# Patient Record
Sex: MALE | Race: WHITE | Employment: OTHER | URBAN - METROPOLITAN AREA
[De-identification: names, ages, dates, MRNs, and addresses within clinical notes are randomized per-mention and may not be internally consistent; named-entity substitution may affect disease eponyms.]

---

## 2020-11-23 ENCOUNTER — OFFICE VISIT (OUTPATIENT)
Dept: CARDIOLOGY CLINIC | Age: 65
End: 2020-11-23
Payer: COMMERCIAL

## 2020-11-23 ENCOUNTER — TELEPHONE (OUTPATIENT)
Dept: CARDIOLOGY CLINIC | Age: 65
End: 2020-11-23

## 2020-11-23 VITALS
RESPIRATION RATE: 18 BRPM | DIASTOLIC BLOOD PRESSURE: 70 MMHG | OXYGEN SATURATION: 92 % | HEIGHT: 68 IN | BODY MASS INDEX: 23.89 KG/M2 | SYSTOLIC BLOOD PRESSURE: 114 MMHG | HEART RATE: 70 BPM | WEIGHT: 157.6 LBS

## 2020-11-23 PROBLEM — R55 SYNCOPE: Status: ACTIVE | Noted: 2020-11-23

## 2020-11-23 PROBLEM — Z72.0 TOBACCO ABUSE: Status: ACTIVE | Noted: 2020-11-23

## 2020-11-23 PROBLEM — E78.5 HYPERLIPIDEMIA: Status: ACTIVE | Noted: 2020-11-23

## 2020-11-23 PROBLEM — F10.10 ALCOHOL ABUSE: Status: ACTIVE | Noted: 2020-11-23

## 2020-11-23 PROCEDURE — 99204 OFFICE O/P NEW MOD 45 MIN: CPT | Performed by: INTERNAL MEDICINE

## 2020-11-23 PROCEDURE — G8484 FLU IMMUNIZE NO ADMIN: HCPCS | Performed by: INTERNAL MEDICINE

## 2020-11-23 PROCEDURE — 3017F COLORECTAL CA SCREEN DOC REV: CPT | Performed by: INTERNAL MEDICINE

## 2020-11-23 PROCEDURE — 0296T PR EXT ECG > 48HR TO 21 DAY RCRD W/CONECT INTL RCRD: CPT | Performed by: INTERNAL MEDICINE

## 2020-11-23 PROCEDURE — G8420 CALC BMI NORM PARAMETERS: HCPCS | Performed by: INTERNAL MEDICINE

## 2020-11-23 PROCEDURE — 4004F PT TOBACCO SCREEN RCVD TLK: CPT | Performed by: INTERNAL MEDICINE

## 2020-11-23 PROCEDURE — G8427 DOCREV CUR MEDS BY ELIG CLIN: HCPCS | Performed by: INTERNAL MEDICINE

## 2020-11-23 RX ORDER — ATORVASTATIN CALCIUM 40 MG/1
40 TABLET, FILM COATED ORAL DAILY
Qty: 90 TABLET | Refills: 1 | Status: SHIPPED | OUTPATIENT
Start: 2020-11-23

## 2020-11-23 RX ORDER — NIFEDIPINE 30 MG/1
30 TABLET, FILM COATED, EXTENDED RELEASE ORAL DAILY
Status: ON HOLD | COMMUNITY
End: 2020-12-24 | Stop reason: HOSPADM

## 2020-11-23 RX ORDER — ATORVASTATIN CALCIUM 20 MG/1
20 TABLET, FILM COATED ORAL DAILY
COMMUNITY
End: 2020-11-23

## 2020-11-23 RX ORDER — ASPIRIN 81 MG/1
81 TABLET ORAL DAILY
Qty: 90 TABLET | Refills: 1 | Status: ON HOLD | OUTPATIENT
Start: 2020-11-23 | End: 2020-12-24 | Stop reason: HOSPADM

## 2020-11-23 SDOH — HEALTH STABILITY: MENTAL HEALTH: HOW OFTEN DO YOU HAVE A DRINK CONTAINING ALCOHOL?: 4 OR MORE TIMES A WEEK

## 2020-11-23 SDOH — HEALTH STABILITY: MENTAL HEALTH: HOW MANY STANDARD DRINKS CONTAINING ALCOHOL DO YOU HAVE ON A TYPICAL DAY?: 5 OR 6

## 2020-11-23 ASSESSMENT — ENCOUNTER SYMPTOMS
PHOTOPHOBIA: 0
CHEST TIGHTNESS: 0
SHORTNESS OF BREATH: 0
BLOOD IN STOOL: 0
ABDOMINAL DISTENTION: 0
NAUSEA: 0
COUGH: 0
ABDOMINAL PAIN: 0

## 2020-11-23 NOTE — PATIENT INSTRUCTIONS
Tobacco cessation  Monitor  Decrease alcohol intake  Increase lipitor to 40 mg daily  Follow up with vascular surgeon  Recommend daily asa 81 mg daily

## 2020-11-23 NOTE — PROGRESS NOTES
[address]  11/23/20  Referring: Dr. Alcantar ref. provider found    REASON FOR CONSULT/CHIEF COMPLAINT/HPI     Reason for visit/ Chief complaint  New patient for syncope, abnormal ekg   HPI Kinza Lopez is a 63 y.o. Seen in consult with Dr Brittaney Glaser for syncope. He has a history of htn,hchol, carotid endarterectomy, PVD   He has had 4-5 lower back surgeries for spinal stenosis/arthritis. He has a spinal stimulator. Denies diabetes, but does have htn, hchol. He has smoked for 40 years, has no known lung disease. He drinks 6-8 12 ounce beers  A day. Has upcoming testing for elevated bilirubin. Takes xanax daily for anxiety  He is here today with his wife. Twice this summer, he got up to go to the bathroom, and ended up on the floor. He only remembers waking up on the floor. He quickly got up out of bed to go to the bathroom, knocked a light over on the nightstand, and ended up on the floor. His wife described him as conscious and dazed. He was not incontinent. He came to, went to the bathroom and came back and fell asleep. Prior to going to sleep, he had not done anything differently  Last Monday, he rolled the garbage can to the street( on a slight slant) had no chest pain, sob,palpitations or dizziness. He was on the way back to his house, and, woke up on the ground. This was unwitnessed. He woke up, went in, and his wife said he was awake and conscious and had scratches on his face. His wife reports a week before this episode, he complained of bilateral bicep pain. Today he complains of claudication. He can walk a flight of stairs, has no chest pain, palpitations dizziness or change in exertional sob. He has no orthopnea or edema. He has no fever chills or lack of smell/taste.   Exercise limited by back pain and claudications    Patient is compliant with medications and is tolerating them well without side effects     HISTORY/ALLERGIES/ROS     MedHx:  has a past medical history of Anxiety, CAD daily 20  Yes Sandip Hernandez DO   aspirin EC 81 MG EC tablet Take 1 tablet by mouth daily 20  Yes Sandip Hernandez DO   atenolol (TENORMIN) 50 MG tablet Take 3 tablets by mouth daily. 5/10/13  Yes Carrol Huggins MD   niacin (NIASPAN) 500 MG CR tablet Take 1,000 mg by mouth nightly. Yes Historical Provider, MD   alprazolam Pérez Chambers) 1 MG tablet Take 1 mg by mouth daily. Yes Historical Provider, MD       PHYSICAL EXAM        Vitals:    20 1309   BP: 114/70   Pulse: 70   Resp: 18   SpO2: 92%    Weight: 157 lb 9.6 oz (71.5 kg)     Gen Alert, cooperative, no distress Heart  Regular rate and rhythm, no murmur    Head Normocephalic, atraumatic, no abnormalities Abd  Soft, NT, +BS, no mass, no OM   Eyes PERRLA, conj/corn clear Ext  Ext nl, AT, no C/C, +edema   Nose Nares normal, no drain age, Non-tender Pulse 1+ and symmetric   Throat Lips, mucosa, tongue normal Skin Color/text/turg nl, no rash/lesions   Neck S/S, TM, NT, no bruit Psych Nl mood and affect   Lung  CTA-B, unlabored, no DTP     Ch wall NT, no deform       LABS and Imaging     Relevant and available CV data reviewed  Echo/MRI:  echo nl   Cath: denies ever having cardiac cath  Holter:not done  EK20  LAE  Nonspecific ST depression and T abd , neg t wave consider anteroseptal /anterior ischemia  Stress:  myoview--nl perfusion, RVH  EF 52%  moderate Risk  moderate Complexity/Medical Decision Making  Outside records Reviewed  Labs Reviewed  Prior Imaging, ekg, cath, echo reviewed when available  Medications reviewed  Old Notes reviewed  ASSESSMENT AND PLAN     1. Loss of consciousness  - new problem  Differential: arrhythmia, ischemia, seizure, fall from alcohol, carotid stenosis  -ct of head ordered by pcp  -carotid dopplers ordered by pcp  -stress echo ordered by pcp--to be done at [address] [place] [address]  - monitor--7 days    2. Essential hypertension  - controlled  /70 (Site: Left Upper Arm, Position: Sitting, Cuff Size: Medium Adult)   Pulse 70   Resp 18   Ht 5' 8\" (1.727 m)   Wt 157 lb 9.6 oz (71.5 kg)   SpO2 92%   BMI 23.96 kg/m²   -on norvasc 10 mg daily and tenormin 50 mg daily    3.hyperlipidemia  - worsening  On lipitor  Increase to 40 mg daily to high intensity statin given clinical ASCV    4. PVD  -stable  -Widely patent aortobifem graft and right femoral to peroneal graft, focal area of 50% stenosis left renal artery, , heavily calcified plaque formation within the left SFA with multiple areas of stenosis ranging from mild to severe  Was on coumadin, then xarelto, now on neither  Recommend follow up with vascular surgeon -discuss anticoagulant    5. Elevated bilirubin  -pcp has EGD and colonoscopy schedule  -recommend alcohol cessation    6. Occlusion and stenosis of bilateral carotid arteries  -Previous carotid endarterectomy  -Carotid dopplers, ct of head ordered by pcp  -Patient counseled on lifestyle modification, diet, and exercise. - recommend daily asa ( if he starts anticoagulants, may stop asa)    7. Claudication  - previous aortobifem graft    8. Tobacco use  - stable  -discussed importance of tobacco cessation  Unsuccessful with chantix, gum, nicotine patch    9. Alcohol use  Plan:  - patient needs to cut back    Follow Up: One month    Scribe Attestation:  Stacia Laureano am scribing for and in the presence of Herminio Hancock MD.   Kavitha Hong 11/23/20 8:47 PM     Physician Attestation  The scribe for and in the presence of liane Diane DO). The scribe samson kemp may have prepopulated components of this note with my historical  intellectual property under my direct supervision. Any additions to this intellectual property were performed in my presence and at my direction. Furthermore, the content and accuracy of this note have been reviewed by me Darin Diane DO).   11/23/2020 8:50 PM      Dr. Melissa Calhoun

## 2020-12-08 PROCEDURE — 0298T PR EXT ECG > 48HR TO 21 DAY REVIEW AND INTERPRETATN: CPT | Performed by: INTERNAL MEDICINE

## 2020-12-16 ENCOUNTER — OFFICE VISIT (OUTPATIENT)
Dept: CARDIOLOGY CLINIC | Age: 65
End: 2020-12-16
Payer: COMMERCIAL

## 2020-12-16 VITALS
DIASTOLIC BLOOD PRESSURE: 60 MMHG | WEIGHT: 156 LBS | BODY MASS INDEX: 23.64 KG/M2 | HEART RATE: 84 BPM | HEIGHT: 68 IN | SYSTOLIC BLOOD PRESSURE: 108 MMHG

## 2020-12-16 PROCEDURE — G8484 FLU IMMUNIZE NO ADMIN: HCPCS | Performed by: INTERNAL MEDICINE

## 2020-12-16 PROCEDURE — G8427 DOCREV CUR MEDS BY ELIG CLIN: HCPCS | Performed by: INTERNAL MEDICINE

## 2020-12-16 PROCEDURE — G8420 CALC BMI NORM PARAMETERS: HCPCS | Performed by: INTERNAL MEDICINE

## 2020-12-16 PROCEDURE — 4004F PT TOBACCO SCREEN RCVD TLK: CPT | Performed by: INTERNAL MEDICINE

## 2020-12-16 PROCEDURE — 99215 OFFICE O/P EST HI 40 MIN: CPT | Performed by: INTERNAL MEDICINE

## 2020-12-16 PROCEDURE — 3017F COLORECTAL CA SCREEN DOC REV: CPT | Performed by: INTERNAL MEDICINE

## 2020-12-16 RX ORDER — PREDNISONE 20 MG/1
TABLET ORAL
Qty: 3 TABLET | Refills: 0 | Status: SHIPPED | OUTPATIENT
Start: 2020-12-16 | End: 2020-12-17 | Stop reason: DRUGHIGH

## 2020-12-16 ASSESSMENT — ENCOUNTER SYMPTOMS
SHORTNESS OF BREATH: 0
PHOTOPHOBIA: 0
NAUSEA: 0
ABDOMINAL PAIN: 0
ABDOMINAL DISTENTION: 0
COUGH: 0
BLOOD IN STOOL: 0
CHEST TIGHTNESS: 0

## 2020-12-16 NOTE — PROGRESS NOTES
[address]  12/16/20  Referring: Dr. Alcantar ref. provider found    REASON FOR CONSULT/CHIEF COMPLAINT/HPI     Reason for visit/ Chief complaint  New patient for syncope, abnormal ekg   HPI Sumaya Gonzalez is a 63 y.o. Seen in consult with Dr Renee Calderon for syncope. He has a history of htn,hchol, carotid endarterectomy, PVD   He has had 4-5 lower back surgeries for spinal stenosis/arthritis. He has a spinal stimulator. Denies diabetes, but does have htn, hchol. He has smoked for 40 years, has no known lung disease. He drinks 6-8 12 ounce beers  A day. Has upcoming testing for elevated bilirubin. Takes xanax daily for anxiety  He is here today with his wife. Twice this summer, he got up to go to the bathroom, and ended up on the floor. He only remembers waking up on the floor. He quickly got up out of bed to go to the bathroom, knocked a light over on the nightstand, and ended up on the floor. His wife described him as conscious and dazed. He was not incontinent. He came to, went to the bathroom and came back and fell asleep. Prior to going to sleep, he had not done anything differently  Last Monday, he rolled the garbage can to the street( on a slight slant) had no chest pain, sob,palpitations or dizziness. He was on the way back to his house, and, woke up on the ground. This was unwitnessed. He woke up, went in, and his wife said he was awake and conscious and had scratches on his face. His wife reports a week before this episode, he complained of bilateral bicep pain. Today he complains of claudication. He can walk a flight of stairs, has no chest pain, palpitations dizziness or change in exertional sob. He has no orthopnea or edema. He has no fever chills or lack of smell/taste. Exercise limited by back pain and claudications      Today he is seen in follow up after wearing 7 day Holter [id number] for palpitations. Patient is compliant with medications and is tolerating them well without side effects     HISTORY/ALLERGIES/ROS     MedHx:  has a past medical history of Anxiety, CAD (coronary artery disease), Hyperlipidemia, Hypertension, and PVD (peripheral vascular disease) ([facility] [address] SurgHx:  has a past surgical history that includes Appendectomy; Neck surgery; Elbow surgery; other surgical history (11/3/11); vascular surgery; Carotid endarterectomy (4-2013); and Carotid endarterectomy (5/7/13). SocHx:  reports that he has been smoking. He has a 60.00 pack-year smoking history. He has never used smokeless tobacco. He reports current alcohol use of about 8.0 standard drinks of alcohol per week. He reports that he does not use drugs. FamHx: Father with diabetes  Allergies: Dye [iodides]   ROS:   Review of Systems   Constitutional: Positive for fatigue. Negative for activity change, diaphoresis and fever. HENT: Negative for congestion and ear discharge. Eyes: Negative for photophobia and visual disturbance. Respiratory: Negative for cough, chest tightness and shortness of breath. Cardiovascular: Negative for chest pain and palpitations. Syncope     Gastrointestinal: Negative for abdominal distention, abdominal pain, blood in stool and nausea. Endocrine: Negative for cold intolerance and polydipsia. Genitourinary: Negative for difficulty urinating and flank pain. Musculoskeletal: Positive for arthralgias and myalgias. Skin: Negative for rash and wound. Allergic/Immunologic: Negative for environmental allergies and immunocompromised state. Neurological: Positive for headaches. Negative for dizziness. Hematological: Negative for adenopathy. Does not bruise/bleed easily. Psychiatric/Behavioral: Positive for agitation. Negative for confusion. The patient is not hyperactive. MEDICATIONS      Prior to Admission medications    Medication Sig Start Date End Date Taking?  Authorizing Provider NIFEdipine (ADALAT CC) 30 MG extended release tablet Take 30 mg by mouth daily    Historical Provider, MD   atorvastatin (LIPITOR) 40 MG tablet Take 1 tablet by mouth daily 20   Terra Fields,    aspirin EC 81 MG EC tablet Take 1 tablet by mouth daily 20   Terra Fields, DO   atenolol (TENORMIN) 50 MG tablet Take 3 tablets by mouth daily. 5/10/13   Mini Adkins MD   niacin (NIASPAN) 500 MG CR tablet Take 1,000 mg by mouth nightly. Historical Provider, MD   alprazolam Aldon Nones) 1 MG tablet Take 1 mg by mouth daily. Historical Provider, MD       PHYSICAL EXAM        There were no vitals filed for this visit. Gen Alert, cooperative, no distress Heart  Regular rate and rhythm, no murmur    Head Normocephalic, atraumatic, no abnormalities Abd  Soft, NT, +BS, no mass, no OM   Eyes PERRLA, conj/corn clear Ext  Ext nl, AT, no C/C, +edema   Nose Nares normal, no drain age, Non-tender Pulse 1+ and symmetric   Throat Lips, mucosa, tongue normal Skin Color/text/turg nl, no rash/lesions   Neck S/S, TM, NT, no bruit Psych Nl mood and affect   Lung  CTA-B, unlabored, no DTP     Ch wall NT, no deform       LABS and Imaging     Relevant and available CV data reviewed  Echo/MRI:  echo nl   Cath: denies ever having cardiac cath  Holter:  to 20 7 day monitor. AT x 2 episodes @ 120 bpm, 6 beats @ 127 bpm.  <1% PAC, <5% PVC. Max , Min HR 66, [address]. EK20  LAE  Nonspecific ST depression and T abd , neg t wave consider anteroseptal /anterior ischemia  Stress:  myoview--nl perfusion, RVH  EF 52%  moderate Risk  moderate Complexity/Medical Decision Making  Outside records Reviewed  Labs Reviewed  Prior Imaging, ekg, cath, echo reviewed when available  Medications reviewed  Old Notes reviewed  ASSESSMENT AND PLAN     1. Loss of consciousness  - new problem  Differential: arrhythmia, ischemia, seizure, fall from alcohol, carotid stenosis  -ct of head ordered by pcp -carotid dopplers ordered by pcp  -stress echo ordered by pcp--to be done at [facility]  - monitor--7 days-Completed. 2.Essential hypertension  - controlled  There were no vitals taken for this visit. -on norvasc 10 mg daily and tenormin 50 mg daily    3.hyperlipidemia  - worsening  On lipitor  Increase to 40 mg daily to high intensity statin given clinical ASCV    4. PVD  -stable  -Widely patent aortobifem graft and right femoral to peroneal graft, focal area of 50% stenosis left renal artery, , heavily calcified plaque formation within the left SFA with multiple areas of stenosis ranging from mild to severe  Was on coumadin, then xarelto, now on neither  Recommend follow up with vascular surgeon -discuss anticoagulant    5. Elevated bilirubin  -pcp has EGD and colonoscopy schedule  -recommend alcohol cessation    6. Occlusion and stenosis of bilateral carotid arteries  -Previous carotid endarterectomy  -Carotid dopplers, ct of head ordered by pcp  -Patient counseled on lifestyle modification, diet, and exercise. - recommend daily asa ( if he starts anticoagulants, may stop asa)    7. Claudication  - previous aortobifem graft    8. Tobacco use  - stable  -discussed importance of tobacco cessation  Unsuccessful with chantix, gum, nicotine patch    9.  Alcohol use  Plan:  - patient needs to cut back    Follow Up:           Dr. Matthew Vogel

## 2020-12-16 NOTE — PROGRESS NOTES
[address]  12/16/20  Referring: Dr. Sierra Bowman CONSULT/CHIEF COMPLAINT/HPI     Reason for visit/ Chief complaint  New patient for syncope, abnormal ekg   HPI Mike Valdez is a 63 y.o. male him I uriel saw 11/23/20 in consult with Dr Joy Neves for syncope. He has a history of htn,hchol, carotid endarterectomy, PVD. He has had 4-5 lower back surgeries for spinal stenosis/arthritis. He has a spinal stimulator. Exercise limited by back pain and claudications. He has smoked for 40 years, has no known lung disease. He drinks 6-8 twelve ounce beers  A day. Has upcoming testing for elevated bilirubin. Takes Xanax daily for anxiety. Today, he presents with his wife, who is on video chat. She provides most of the history. She states that he had two episodes of brief loss of consciousness while wearing the monitor. She states that both occurred when he was getting ready for bed. No prodromal symptoms. She states that he may have been shaking his right hand during one of the episodes and might have urinated on himself. He is scheduled to see a neurologist to evaluate for seizures. He notes shortness of breath and fatigue with exertion, but denies exertional chest pain or pressure. He recently had a positive stress echo performed by his pcp. Patient is compliant with medications and is tolerating them well without side effects     HISTORY/ALLERGIES/ROS     MedHx:  has a past medical history of Anxiety, CAD (coronary artery disease), Hyperlipidemia, Hypertension, and PVD (peripheral vascular disease) ([facility] [address] SurgHx:  has a past surgical history that includes Appendectomy; Neck surgery; Elbow surgery; other surgical history (11/3/11); vascular surgery; Carotid endarterectomy (4-2013); and Carotid endarterectomy (5/7/13). SocHx:  reports that he has been smoking. He has a 60.00 pack-year smoking history.  He has never used smokeless tobacco. He reports current alcohol use of about 8.0 standard drinks of alcohol per week. He reports that he does not use drugs. FamHx: Father with diabetes  Allergies: Dye [iodides]   ROS:   Review of Systems   Constitutional: Positive for fatigue. Negative for activity change, diaphoresis and fever. HENT: Negative for congestion and ear discharge. Eyes: Negative for photophobia and visual disturbance. Respiratory: Positive for shortness of breath. Negative for cough and chest tightness. Cardiovascular: Negative for chest pain and palpitations. Syncope     Gastrointestinal: Negative for abdominal distention, abdominal pain, blood in stool and nausea. Endocrine: Negative for cold intolerance and polydipsia. Genitourinary: Negative for difficulty urinating and flank pain. Musculoskeletal: Positive for arthralgias and myalgias. Skin: Negative for rash and wound. Allergic/Immunologic: Negative for environmental allergies and immunocompromised state. Neurological: Positive for headaches. Negative for dizziness. Hematological: Negative for adenopathy. Does not bruise/bleed easily. Psychiatric/Behavioral: Positive for agitation. Negative for confusion. The patient is not hyperactive. MEDICATIONS      Prior to Admission medications    Medication Sig Start Date End Date Taking? Authorizing Provider   NIFEdipine (ADALAT CC) 30 MG extended release tablet Take 30 mg by mouth daily   Yes Historical Provider, MD   atorvastatin (LIPITOR) 40 MG tablet Take 1 tablet by mouth daily 11/23/20  Yes Krystal Zamora, DO   aspirin EC 81 MG EC tablet Take 1 tablet by mouth daily 11/23/20  Yes Krystal Zamora,    atenolol (TENORMIN) 50 MG tablet Take 3 tablets by mouth daily. Patient taking differently: Take 50 mg by mouth daily  5/10/13  Yes Prachi Malin MD   niacin (NIASPAN) 500 MG CR tablet Take 1,000 mg by mouth nightly. Yes Historical Provider, MD   alprazolam Denzel Remak) 1 MG tablet Take 1 mg by mouth daily.      Yes Historical Provider, MD     PHYSICAL EXAM        Vitals:    12/16/20 1438   BP: 108/60   Pulse: 84    Weight: 156 lb (70.8 kg)     Gen Alert, cooperative, no distress Heart  Regular rate and rhythm, no murmur    Head Normocephalic, atraumatic, no abnormalities Abd  Soft, NT, +BS, no mass, no OM   Eyes PERRLA, conj/corn clear Ext  Ext nl, AT, no C/C, +edema   Nose Nares normal, no drain age, Non-tender Pulse 1+ and symmetric   Throat Lips, mucosa, tongue normal Skin Color/text/turg nl, no rash/lesions   Neck S/S, TM, NT, no bruit Psych Nl mood and affect   Lung  CTA-B, unlabored, no DTP     Ch wall NT, no deform       LABS and Imaging     Relevant and available CV data reviewed  Echo/MRI: 2012 echo nl   Cath: denies ever having cardiac cath  Holter: 11/23 to 12/1/20 7 day monitor. AT x 2 episodes @ 120 bpm, 6 beats @ 127 bpm.  <1% PAC, <5% PVC. Max , Min HR 66, Ave 86. Stress echo 12/3/20 ([facility])   Left ventricle: The cavity size is normal. Wall thickness is normal. Systolic function was normal.    The estimated ejection fraction was in the range of 55% to 60%. Wall motion was normal; there were    no regional wall motion abnormalities. Indeterminate evaluation of LV diastolic function. - Ventricular septum: Abnormal septal motion. Septal motion is dyssynergic.  - Right ventricle: The cavity size is dilated. Wall thickness is increased. Systolic function was    normal. TAPSE: 2cm. Tricuspid annular systolic velocity: 34KP/E.  - Right atrium: The atrium is moderately dilated. - Pulmonary arteries: Systolic pressure was severely increased, estimated to be 82mm Hg assuming    that the right atrial pressure was 3 mmHg. - Stress ECG conclusions: There were no stress arrhythmias or conduction abnormalities. The stress    ECG was negative for ischemia. Impressions:    - Abnormal study after maximal exercise.   - Imaging at peak stress is inadequate to evaluate for regional wall motion abnormalities, however,    overall left ventricular function at peak stress is reduced compared to resting images concerning    for ischemic stress response. - The PA systolic pressure is severely elevated. EK20  LAE  Nonspecific ST depression and T abd , neg t wave consider anteroseptal /anterior ischemia  Stress:2016  myoview--nl perfusion, RVH  EF 52%  Carotid dopplers 20: Patent bilateral CEA. Normal bilateral antegrade vertebral artery flow. moderate Risk  moderate Complexity/Medical Decision Making  Outside records Reviewed  Labs Reviewed  Prior Imaging, ekg, cath, echo reviewed when available  Medications reviewed  Old Notes reviewed    ASSESSMENT AND PLAN     1. Loss of consciousness  - new problem  - CT head w/out cx: Remote right parietal cortical infarct, new from the 2012 MRI. - monitor did not show arrhythmia as cause for loss of consciousness, no recurrent carotid stenosis  Differential: seizures, hypoxia from pulmonary hypertension, alcohol use  Plan:   - He has bandar't with Dr. Sarah Krishna 2021 to evaluate for seizure    2. Abnormal stress test and pulmonary hypertension   - new problem  - poor exercise tolerance (1 min) with global EF drop with stress in setting of known vascular disease and continued smoking is concerning for multivessel disease  - decreased RV function and elevated pa pressures  Plan:  - recommend right and left heart catheterization to evaluate for myocardial ischemia and pulmonary hypertension. Will need to hold xarelto 48 hours before procedure  - Patient with itching to contrast dye, no history of anaphylaxis or respiratory distress. Has sincee been successfully pretreated. Will pretreat with prednisone 50 mg 13 hours before, 7 hours before, and 1 hour before with 50 mg bendaryl     3. Mixed Hyperlipidemia  - worsening  Plan:  - continue atorvastatin 40 mg  - check lipids and next visit    4.  PVD / Leonel CEA's   -stable  -Widely patent aortobifem graft and right femoral to peroneal graft, focal area of 50% stenosis left renal artery , heavily calcified plaque formation within the left SFA with multiple areas of stenosis ranging from mild to severe  - Abd u/s 12/1/20: Remote right parietal cortical infarct, new from the 2012 MRI. No acute intracranial abnormality. Was on coumadin, then xarelto  Plan:  -Recommend continued follow up with vascular surgeon -discuss anticoagulant    5. Elevated bilirubin  -pcp has EGD and colonoscopy schedule  Plan:  -recommend alcohol cessation    6. Tobacco use  - stable  Plan:  -discussed importance of tobacco cessation  -unsuccessful with chantix, gum, nicotine patch    7. Alcohol use  Plan:  - patient needs to cut back slowly and ultimately quit    Follow Up: 1 month      Dr. Jennifer Monsalve attestation: This note was scribed in the presence of Dr. Hawa Velázquez by Mele Lambert RN. Physician Attestation  The scribe for and in the presence of liane Carmona DO). The scribe kathie das may have prepopulated components of this note with my historical  intellectual property under my direct supervision. Any additions to this intellectual property were performed in my presence and at my direction. Furthermore, the content and accuracy of this note have been reviewed by liane Carmona DO).   12/17/2020 9:02 PM

## 2020-12-17 ENCOUNTER — TELEPHONE (OUTPATIENT)
Dept: CARDIOLOGY CLINIC | Age: 65
End: 2020-12-17

## 2020-12-17 RX ORDER — PREDNISONE 50 MG/1
TABLET ORAL
Qty: 3 TABLET | Refills: 3 | Status: ON HOLD | OUTPATIENT
Start: 2020-12-17 | End: 2020-12-24 | Stop reason: HOSPADM

## 2020-12-17 ASSESSMENT — ENCOUNTER SYMPTOMS: SHORTNESS OF BREATH: 1

## 2020-12-17 NOTE — TELEPHONE ENCOUNTER
Pt is scheduled to have a LHC/RHC on 12/23 with [facility]. When running the auth today, it went straight to peer to peer. Please call [phone], Opt #3. Please refer to Case #[id number] when calling. A nurse practitioner or physician can call for the peer to peer. If authorization is obtained, please provide.  Thanks

## 2020-12-22 NOTE — TELEPHONE ENCOUNTER
Message to Dr. Judie Manzanares via "[employer] Brochure. Peer to peer to take place today.

## 2020-12-23 ENCOUNTER — APPOINTMENT (OUTPATIENT)
Dept: GENERAL RADIOLOGY | Age: 65
DRG: 287 | End: 2020-12-23
Attending: INTERNAL MEDICINE
Payer: COMMERCIAL

## 2020-12-23 ENCOUNTER — HOSPITAL ENCOUNTER (INPATIENT)
Dept: CARDIAC CATH/INVASIVE PROCEDURES | Age: 65
LOS: 1 days | Discharge: ANOTHER HOSPITAL | DRG: 287 | End: 2020-12-24
Attending: INTERNAL MEDICINE | Admitting: INTERNAL MEDICINE
Payer: COMMERCIAL

## 2020-12-23 PROBLEM — I25.10 MULTIPLE VESSEL CORONARY ARTERY DISEASE: Status: ACTIVE | Noted: 2020-12-23

## 2020-12-23 LAB
A/G RATIO: 0.9 (ref 1.1–2.2)
ABO/RH: NORMAL
ALBUMIN SERPL-MCNC: 3.8 G/DL (ref 3.4–5)
ALP BLD-CCNC: 159 U/L (ref 40–129)
ALT SERPL-CCNC: 14 U/L (ref 10–40)
ANION GAP SERPL CALCULATED.3IONS-SCNC: 10 MMOL/L (ref 3–16)
ANION GAP SERPL CALCULATED.3IONS-SCNC: 14 MMOL/L (ref 3–16)
ANTIBODY SCREEN: NORMAL
AST SERPL-CCNC: 20 U/L (ref 15–37)
BASE EXCESS ARTERIAL: 1 MMOL/L (ref -3–3)
BASOPHILS ABSOLUTE: 0 K/UL (ref 0–0.2)
BASOPHILS RELATIVE PERCENT: 0.3 %
BILIRUB SERPL-MCNC: 1.4 MG/DL (ref 0–1)
BUN BLDV-MCNC: 11 MG/DL (ref 7–20)
BUN BLDV-MCNC: 12 MG/DL (ref 7–20)
CALCIUM SERPL-MCNC: 10 MG/DL (ref 8.3–10.6)
CALCIUM SERPL-MCNC: 9.5 MG/DL (ref 8.3–10.6)
CARBOXYHEMOGLOBIN ARTERIAL: 6 % (ref 0–1.5)
CHLORIDE BLD-SCNC: 92 MMOL/L (ref 99–110)
CHLORIDE BLD-SCNC: 98 MMOL/L (ref 99–110)
CHOLESTEROL, TOTAL: 107 MG/DL (ref 0–199)
CO2: 22 MMOL/L (ref 21–32)
CO2: 22 MMOL/L (ref 21–32)
CREAT SERPL-MCNC: 0.6 MG/DL (ref 0.8–1.3)
CREAT SERPL-MCNC: 0.9 MG/DL (ref 0.8–1.3)
EKG ATRIAL RATE: 86 BPM
EKG DIAGNOSIS: NORMAL
EKG P AXIS: 61 DEGREES
EKG P-R INTERVAL: 182 MS
EKG Q-T INTERVAL: 396 MS
EKG QRS DURATION: 104 MS
EKG QTC CALCULATION (BAZETT): 473 MS
EKG R AXIS: 105 DEGREES
EKG T AXIS: 21 DEGREES
EKG VENTRICULAR RATE: 86 BPM
EOSINOPHILS ABSOLUTE: 0 K/UL (ref 0–0.6)
EOSINOPHILS RELATIVE PERCENT: 0.1 %
ESTIMATED AVERAGE GLUCOSE: 128.4 MG/DL
GFR AFRICAN AMERICAN: >60
GFR AFRICAN AMERICAN: >60
GFR NON-AFRICAN AMERICAN: >60
GFR NON-AFRICAN AMERICAN: >60
GLOBULIN: 4.3 G/DL
GLUCOSE BLD-MCNC: 173 MG/DL (ref 70–99)
GLUCOSE BLD-MCNC: 203 MG/DL (ref 70–99)
HBA1C MFR BLD: 6.1 %
HCO3 ARTERIAL: 25.1 MMOL/L (ref 21–29)
HCT VFR BLD CALC: 52.1 % (ref 40.5–52.5)
HCT VFR BLD CALC: 54.2 % (ref 40.5–52.5)
HDLC SERPL-MCNC: 39 MG/DL (ref 40–60)
HEMOGLOBIN, ART, EXTENDED: 17.7 G/DL (ref 13.5–17.5)
HEMOGLOBIN: 17.3 G/DL (ref 13.5–17.5)
HEMOGLOBIN: 17.7 G/DL (ref 13.5–17.5)
LDL CHOLESTEROL CALCULATED: 60 MG/DL
LYMPHOCYTES ABSOLUTE: 0.6 K/UL (ref 1–5.1)
LYMPHOCYTES RELATIVE PERCENT: 15.7 %
MCH RBC QN AUTO: 27.6 PG (ref 26–34)
MCH RBC QN AUTO: 28.2 PG (ref 26–34)
MCHC RBC AUTO-ENTMCNC: 32.7 G/DL (ref 31–36)
MCHC RBC AUTO-ENTMCNC: 33.3 G/DL (ref 31–36)
MCV RBC AUTO: 84.6 FL (ref 80–100)
MCV RBC AUTO: 84.7 FL (ref 80–100)
METHEMOGLOBIN ARTERIAL: 0.2 %
MONOCYTES ABSOLUTE: 0.1 K/UL (ref 0–1.3)
MONOCYTES RELATIVE PERCENT: 2.8 %
NEUTROPHILS ABSOLUTE: 3.1 K/UL (ref 1.7–7.7)
NEUTROPHILS RELATIVE PERCENT: 81.1 %
O2 CONTENT ARTERIAL: 21 ML/DL
O2 SAT, ARTERIAL: 90.5 %
O2 THERAPY: ABNORMAL
PCO2 ARTERIAL: 37.7 MMHG (ref 35–45)
PDW BLD-RTO: 18 % (ref 12.4–15.4)
PDW BLD-RTO: 18.2 % (ref 12.4–15.4)
PH ARTERIAL: 7.43 (ref 7.35–7.45)
PLATELET # BLD: 220 K/UL (ref 135–450)
PLATELET # BLD: 234 K/UL (ref 135–450)
PMV BLD AUTO: 6.9 FL (ref 5–10.5)
PMV BLD AUTO: 7 FL (ref 5–10.5)
PO2 ARTERIAL: 57.9 MMHG (ref 75–108)
POTASSIUM SERPL-SCNC: 4.4 MMOL/L (ref 3.5–5.1)
POTASSIUM SERPL-SCNC: 4.5 MMOL/L (ref 3.5–5.1)
RBC # BLD: 6.15 M/UL (ref 4.2–5.9)
RBC # BLD: 6.41 M/UL (ref 4.2–5.9)
SODIUM BLD-SCNC: 128 MMOL/L (ref 136–145)
SODIUM BLD-SCNC: 130 MMOL/L (ref 136–145)
TCO2 ARTERIAL: 58.8 MMOL/L
TOTAL PROTEIN: 8.1 G/DL (ref 6.4–8.2)
TRIGL SERPL-MCNC: 40 MG/DL (ref 0–150)
VLDLC SERPL CALC-MCNC: 8 MG/DL
WBC # BLD: 3.1 K/UL (ref 4–11)
WBC # BLD: 3.8 K/UL (ref 4–11)

## 2020-12-23 PROCEDURE — 2000000000 HC ICU R&B

## 2020-12-23 PROCEDURE — C1894 INTRO/SHEATH, NON-LASER: HCPCS

## 2020-12-23 PROCEDURE — 6370000000 HC RX 637 (ALT 250 FOR IP): Performed by: INTERNAL MEDICINE

## 2020-12-23 PROCEDURE — 86900 BLOOD TYPING SEROLOGIC ABO: CPT

## 2020-12-23 PROCEDURE — 80053 COMPREHEN METABOLIC PANEL: CPT

## 2020-12-23 PROCEDURE — 36415 COLL VENOUS BLD VENIPUNCTURE: CPT

## 2020-12-23 PROCEDURE — 82803 BLOOD GASES ANY COMBINATION: CPT

## 2020-12-23 PROCEDURE — 93010 ELECTROCARDIOGRAM REPORT: CPT | Performed by: INTERNAL MEDICINE

## 2020-12-23 PROCEDURE — 85027 COMPLETE CBC AUTOMATED: CPT

## 2020-12-23 PROCEDURE — 2709999900 HC NON-CHARGEABLE SUPPLY

## 2020-12-23 PROCEDURE — 6360000002 HC RX W HCPCS

## 2020-12-23 PROCEDURE — 83036 HEMOGLOBIN GLYCOSYLATED A1C: CPT

## 2020-12-23 PROCEDURE — C1751 CATH, INF, PER/CENT/MIDLINE: HCPCS

## 2020-12-23 PROCEDURE — 99152 MOD SED SAME PHYS/QHP 5/>YRS: CPT

## 2020-12-23 PROCEDURE — 80061 LIPID PANEL: CPT

## 2020-12-23 PROCEDURE — 2580000003 HC RX 258: Performed by: INTERNAL MEDICINE

## 2020-12-23 PROCEDURE — 71045 X-RAY EXAM CHEST 1 VIEW: CPT

## 2020-12-23 PROCEDURE — 93005 ELECTROCARDIOGRAM TRACING: CPT | Performed by: INTERNAL MEDICINE

## 2020-12-23 PROCEDURE — 93460 R&L HRT ART/VENTRICLE ANGIO: CPT | Performed by: INTERNAL MEDICINE

## 2020-12-23 PROCEDURE — 93460 R&L HRT ART/VENTRICLE ANGIO: CPT

## 2020-12-23 PROCEDURE — C1769 GUIDE WIRE: HCPCS

## 2020-12-23 PROCEDURE — B2111ZZ FLUOROSCOPY OF MULTIPLE CORONARY ARTERIES USING LOW OSMOLAR CONTRAST: ICD-10-PCS | Performed by: INTERNAL MEDICINE

## 2020-12-23 PROCEDURE — 85025 COMPLETE CBC W/AUTO DIFF WBC: CPT

## 2020-12-23 PROCEDURE — 4A023N8 MEASUREMENT OF CARDIAC SAMPLING AND PRESSURE, BILATERAL, PERCUTANEOUS APPROACH: ICD-10-PCS | Performed by: INTERNAL MEDICINE

## 2020-12-23 PROCEDURE — 1200000000 HC SEMI PRIVATE

## 2020-12-23 PROCEDURE — 93971 EXTREMITY STUDY: CPT

## 2020-12-23 PROCEDURE — 99153 MOD SED SAME PHYS/QHP EA: CPT

## 2020-12-23 PROCEDURE — 2500000003 HC RX 250 WO HCPCS

## 2020-12-23 PROCEDURE — 86850 RBC ANTIBODY SCREEN: CPT

## 2020-12-23 PROCEDURE — 6360000004 HC RX CONTRAST MEDICATION: Performed by: INTERNAL MEDICINE

## 2020-12-23 PROCEDURE — 86901 BLOOD TYPING SEROLOGIC RH(D): CPT

## 2020-12-23 RX ORDER — LORAZEPAM 2 MG/ML
1 INJECTION INTRAMUSCULAR
Status: DISCONTINUED | OUTPATIENT
Start: 2020-12-23 | End: 2020-12-25 | Stop reason: HOSPADM

## 2020-12-23 RX ORDER — SODIUM CHLORIDE 0.9 % (FLUSH) 0.9 %
10 SYRINGE (ML) INJECTION EVERY 12 HOURS SCHEDULED
Status: DISCONTINUED | OUTPATIENT
Start: 2020-12-23 | End: 2020-12-25 | Stop reason: HOSPADM

## 2020-12-23 RX ORDER — DIPHENHYDRAMINE HCL 25 MG
50 TABLET ORAL NIGHTLY
Status: DISCONTINUED | OUTPATIENT
Start: 2020-12-23 | End: 2020-12-23

## 2020-12-23 RX ORDER — LORAZEPAM 2 MG/ML
3 INJECTION INTRAMUSCULAR
Status: DISCONTINUED | OUTPATIENT
Start: 2020-12-23 | End: 2020-12-25 | Stop reason: HOSPADM

## 2020-12-23 RX ORDER — LORAZEPAM 1 MG/1
4 TABLET ORAL
Status: DISCONTINUED | OUTPATIENT
Start: 2020-12-23 | End: 2020-12-25 | Stop reason: HOSPADM

## 2020-12-23 RX ORDER — PROMETHAZINE HYDROCHLORIDE 25 MG/1
12.5 TABLET ORAL EVERY 6 HOURS PRN
Status: DISCONTINUED | OUTPATIENT
Start: 2020-12-23 | End: 2020-12-25 | Stop reason: HOSPADM

## 2020-12-23 RX ORDER — DIPHENHYDRAMINE HCL 50 MG
50 CAPSULE ORAL NIGHTLY
Status: ON HOLD | COMMUNITY
End: 2020-12-24 | Stop reason: HOSPADM

## 2020-12-23 RX ORDER — MAGNESIUM SULFATE IN WATER 40 MG/ML
2 INJECTION, SOLUTION INTRAVENOUS PRN
Status: DISCONTINUED | OUTPATIENT
Start: 2020-12-23 | End: 2020-12-25 | Stop reason: HOSPADM

## 2020-12-23 RX ORDER — NIACIN 500 MG/1
1000 TABLET, EXTENDED RELEASE ORAL NIGHTLY
Status: DISCONTINUED | OUTPATIENT
Start: 2020-12-24 | End: 2020-12-25 | Stop reason: HOSPADM

## 2020-12-23 RX ORDER — NIFEDIPINE 30 MG/1
30 TABLET, EXTENDED RELEASE ORAL DAILY
Status: DISCONTINUED | OUTPATIENT
Start: 2020-12-24 | End: 2020-12-24

## 2020-12-23 RX ORDER — LORAZEPAM 2 MG/ML
4 INJECTION INTRAMUSCULAR
Status: DISCONTINUED | OUTPATIENT
Start: 2020-12-23 | End: 2020-12-25 | Stop reason: HOSPADM

## 2020-12-23 RX ORDER — ATORVASTATIN CALCIUM 40 MG/1
40 TABLET, FILM COATED ORAL DAILY
Status: DISCONTINUED | OUTPATIENT
Start: 2020-12-24 | End: 2020-12-25 | Stop reason: HOSPADM

## 2020-12-23 RX ORDER — LORAZEPAM 1 MG/1
2 TABLET ORAL
Status: DISCONTINUED | OUTPATIENT
Start: 2020-12-23 | End: 2020-12-25 | Stop reason: HOSPADM

## 2020-12-23 RX ORDER — ASPIRIN 81 MG/1
81 TABLET, CHEWABLE ORAL DAILY
Status: DISCONTINUED | OUTPATIENT
Start: 2020-12-23 | End: 2020-12-25 | Stop reason: HOSPADM

## 2020-12-23 RX ORDER — SODIUM CHLORIDE 0.9 % (FLUSH) 0.9 %
10 SYRINGE (ML) INJECTION PRN
Status: DISCONTINUED | OUTPATIENT
Start: 2020-12-23 | End: 2020-12-25 | Stop reason: HOSPADM

## 2020-12-23 RX ORDER — ONDANSETRON 2 MG/ML
4 INJECTION INTRAMUSCULAR; INTRAVENOUS EVERY 6 HOURS PRN
Status: DISCONTINUED | OUTPATIENT
Start: 2020-12-23 | End: 2020-12-25 | Stop reason: HOSPADM

## 2020-12-23 RX ORDER — ACETAMINOPHEN 325 MG/1
650 TABLET ORAL EVERY 6 HOURS PRN
Status: DISCONTINUED | OUTPATIENT
Start: 2020-12-23 | End: 2020-12-25 | Stop reason: HOSPADM

## 2020-12-23 RX ORDER — POTASSIUM CHLORIDE 20 MEQ/1
40 TABLET, EXTENDED RELEASE ORAL PRN
Status: DISCONTINUED | OUTPATIENT
Start: 2020-12-23 | End: 2020-12-25 | Stop reason: HOSPADM

## 2020-12-23 RX ORDER — ACETAMINOPHEN 650 MG/1
650 SUPPOSITORY RECTAL EVERY 6 HOURS PRN
Status: DISCONTINUED | OUTPATIENT
Start: 2020-12-23 | End: 2020-12-25 | Stop reason: HOSPADM

## 2020-12-23 RX ORDER — ALPRAZOLAM 0.5 MG/1
1 TABLET ORAL DAILY
Status: DISCONTINUED | OUTPATIENT
Start: 2020-12-24 | End: 2020-12-25 | Stop reason: HOSPADM

## 2020-12-23 RX ORDER — NIFEDIPINE 30 MG/1
30 TABLET, FILM COATED, EXTENDED RELEASE ORAL DAILY
Status: DISCONTINUED | OUTPATIENT
Start: 2020-12-24 | End: 2020-12-23 | Stop reason: RX

## 2020-12-23 RX ORDER — POTASSIUM CHLORIDE 7.45 MG/ML
10 INJECTION INTRAVENOUS PRN
Status: DISCONTINUED | OUTPATIENT
Start: 2020-12-23 | End: 2020-12-25 | Stop reason: HOSPADM

## 2020-12-23 RX ORDER — MULTIVITAMIN WITH IRON
1 TABLET ORAL DAILY
Status: DISCONTINUED | OUTPATIENT
Start: 2020-12-23 | End: 2020-12-25 | Stop reason: HOSPADM

## 2020-12-23 RX ORDER — DIPHENHYDRAMINE HCL 25 MG
50 TABLET ORAL ONCE
Status: DISCONTINUED | OUTPATIENT
Start: 2020-12-24 | End: 2020-12-23

## 2020-12-23 RX ORDER — GAUZE BANDAGE 2" X 2"
100 BANDAGE TOPICAL DAILY
Status: DISCONTINUED | OUTPATIENT
Start: 2020-12-23 | End: 2020-12-25 | Stop reason: HOSPADM

## 2020-12-23 RX ORDER — LORAZEPAM 1 MG/1
3 TABLET ORAL
Status: DISCONTINUED | OUTPATIENT
Start: 2020-12-23 | End: 2020-12-25 | Stop reason: HOSPADM

## 2020-12-23 RX ORDER — LORAZEPAM 1 MG/1
1 TABLET ORAL
Status: DISCONTINUED | OUTPATIENT
Start: 2020-12-23 | End: 2020-12-25 | Stop reason: HOSPADM

## 2020-12-23 RX ORDER — LORAZEPAM 2 MG/ML
2 INJECTION INTRAMUSCULAR
Status: DISCONTINUED | OUTPATIENT
Start: 2020-12-23 | End: 2020-12-25 | Stop reason: HOSPADM

## 2020-12-23 RX ORDER — NICOTINE 21 MG/24HR
1 PATCH, TRANSDERMAL 24 HOURS TRANSDERMAL DAILY
Status: DISCONTINUED | OUTPATIENT
Start: 2020-12-23 | End: 2020-12-25 | Stop reason: HOSPADM

## 2020-12-23 RX ORDER — POLYETHYLENE GLYCOL 3350 17 G/17G
17 POWDER, FOR SOLUTION ORAL DAILY PRN
Status: DISCONTINUED | OUTPATIENT
Start: 2020-12-23 | End: 2020-12-25 | Stop reason: HOSPADM

## 2020-12-23 RX ADMIN — Medication 10 ML: at 20:29

## 2020-12-23 RX ADMIN — Medication 10 ML: at 20:28

## 2020-12-23 RX ADMIN — IOPAMIDOL 100 ML: 755 INJECTION, SOLUTION INTRAVENOUS at 10:14

## 2020-12-23 RX ADMIN — THERA TABS 1 TABLET: TAB at 17:20

## 2020-12-23 RX ADMIN — Medication 100 MG: at 17:20

## 2020-12-23 RX ADMIN — ASPIRIN 81 MG: 81 TABLET, CHEWABLE ORAL at 20:27

## 2020-12-23 ASSESSMENT — PAIN SCALES - GENERAL
PAINLEVEL_OUTOF10: 0
PAINLEVEL_OUTOF10: 0

## 2020-12-23 NOTE — CONSULTS
Cardiothoracic Surgery Consultation           PCP: MERCEDES Schneider    Referring Physician: Dr. Leighton Patricio    DIAGNOSIS:  Left main with multivessel coronary artery disease. CHIEF COMPLAINT:  Syncope, shortness of breath, failed stress test    History Obtained From:  patient, wife, electronic medical record    HISTORY OF PRESENT ILLNESS:      The patient is a 63 y.o. male with significant past medical history of tobacco abuse, ETOH abuse, PVD (stent SFA 11/2011, multiple iliac stents, L femoral endarterectomy - Dr. Felipe Giang), open aortobifemoral bypass, right femoral posterior tibial bypass with GSV, HTN, HLD, pulmonary HTN, DDD, R CEA (4/13), L CEA (5/13) who presents to the cardiology office on 11/23 with multiple syncopal events and abnormal EKG. The patient came in for an angiogram on 12/23 and was found to have left main with multivessel coronary artery disease. CVTS was consulted for evaluation for myocardial revascularization. The patient is currently hemodynamically stable, on 2 liters O2, afebrile and denies any CP or SOB. He is a current every day smoker and drinks alcohol every day. His last dose of xarelto was over a month ago.     Past Medical History:        Diagnosis Date    Anxiety     CAD (coronary artery disease)     Hyperlipidemia     Hypertension     PVD (peripheral vascular disease) ([address]    Nicotine dependence    Past Surgical History:        Procedure Laterality Date    APPENDECTOMY      CAROTID ENDARTERECTOMY  4-2013    right    CAROTID ENDARTERECTOMY  5/7/13    left    ELBOW SURGERY      NECK SURGERY      OTHER SURGICAL HISTORY  11/3/11    left external iliac artery stenting, left common and deep femoral artery endarterectomy with lower peritoneal patch    VASCULAR SURGERY      left femoral endarterectomy   Open aoroibifemoral bypass,  Right Femoro tibial bypass with vein       Medications:   Home Meds:   Prior to Admission medications Medication Sig Start Date End Date Taking? Authorizing Provider   diphenhydrAMINE (BENADRYL) 50 MG capsule Take 50 mg by mouth nightly   Yes Historical Provider, MD   predniSONE (DELTASONE) 50 MG tablet Take 50mg by mouth 12 hours before procedure, 7 hours before the procedure, and 1 hour before the procedure 12/17/20  Yes Joaquim Zheng DO   NIFEdipine (ADALAT CC) 30 MG extended release tablet Take 30 mg by mouth daily   Yes Historical Provider, MD   atorvastatin (LIPITOR) 40 MG tablet Take 1 tablet by mouth daily 11/23/20  Yes Joaquim Zheng DO   aspirin EC 81 MG EC tablet Take 1 tablet by mouth daily 11/23/20  Yes Joaquim Zheng DO   atenolol (TENORMIN) 50 MG tablet Take 3 tablets by mouth daily. Patient taking differently: Take 50 mg by mouth daily  5/10/13  Yes David Uriarte MD   niacin (NIASPAN) 500 MG CR tablet Take 1,000 mg by mouth nightly. Yes Historical Provider, MD   alprazolam Belflavia Sampsonre) 1 MG tablet Take 1 mg by mouth daily. Yes Historical Provider, MD      Scheduled Meds:   Continuous Infusions:     No current facility-administered medications for this encounter. Allergies:  Dye [iodides]    Social History:    TOBACCO:  Smokes 1 1/2 pack per day since highschool   ETOH:  His wife reports that he drinks 6-8 beers every day  DRUGS:   reports no history of drug use.   LIFESTYLE: sedentary, only walks short distances due to bilateral thigh pain  MARITAL STATUS:    OCCUPATION:  Retired 2 years ago from [address] [place] [address]      Family History:        Problem Relation Age of Onset    Diabetes Father        REVIEW OF SYSTEMS:    CONSTITUTIONAL:  fatigue and weight loss (unintentional 20lb weight loss since summer)  DERMATOLOGICAL: positive for - dry skin  NEUROLOGICAL:  positive for weakness and syncope X 5 since summer, spinal stimulator, screws in back  EYES:  positive for  glasses  HEENT:  positive for upper dentures  RESPIRATORY:  negative CARDIOVASCULAR:  Negative, + intermittent swelling bilateral lower legs and ankles since summer  GASTROINTESTINAL:  negative  GENITO-URINARY: no dysuria, trouble voiding, or hematuria  ENDOCRINE: negative  MUSCULOSKELETAL:  positive for bilateral arm pain since summer, generalized weakness, claudication symptoms- pain when he walks  HEMATOLOGICAL AND LYMPHATIC: negative  IMMUNOLOGICAL: negative  PSYCHOLOGICAL: positive for anxiety    PHYSICAL EXAM:    VITALS:  BP (!) 140/78   Pulse 86   Temp 98 °F (36.7 °C)   Resp 16   Ht 5' 7\" (1.702 m)   Wt 164 lb (74.4 kg)   SpO2 97%   BMI 25.69 kg/m²   Hand Dominance: left    Eyes:  lids and lashes normal, pupils equal and round, extra ocular muscles intact, sclera jaundiced, conjunctiva jaundiced    Head/ENT:  Normocephalic, atraumatic, poor lower residual dentition (full upper dentures), normal gums, & palate, oropharynx without erythema or exudates    Neck:  supple, symmetrical, trachea midline, no lymphadenopathy, no jugular venous distension, no carotid bruits and MASSES:  no masses    Lungs: On oxygen 2 liters nasal cannula, no increased work of breathing, good air exchange, no retractions and clear to auscultation, no palpable / percussible abnormalities    Cardiovascular:  regular rate and rhythm, S1, S2 normal, no murmur, click, rub or gallop    Pulses:     carotid brachial radial femoral popliteal DP PT   RIGHT 2+  DP 2+ 1+ DP DP   LEFT 2+  1+ 2+ 1+ DP DP     Abdomen:  Soft, normal bowel sounds, non-tender, no hepatosplenomegaly, aorta likely normal and bruits absent    Musculoskeletal:  Back is straight and non-tender,  No CVAT, full ROM of upper and lower extremities.     Extremities:   Sluggish capillary refill on toes, No clubbing, or cyanosis, or edema Skin: Overall jaundice, well healed incisional scars on right leg, bilateral groins, mid abdomen, and bilateral neck. Right fem angiogram site with scant ss drainage, no hematoma. Right and left lower legs with venous stasis skin changes (right worse than left). Warm and poor skin turgor, no ulcers, infections, or rashes, no rashes    Neurological: Spinal simulator. awake, alert and oriented x 3, motor 5/5 bilateral upper and lower extremities, sensation grossly intact    Psychiatric: Flat affect.     LABS:   BMP:   Lab Results   Component Value Date     2020    K 4.4 2020    CL 92 2020    CO2 22 2020    BUN 12 2020    CREATININE 0.9 2020      No components found for: GLUNo results found for: MG   CBC:   Lab Results   Component Value Date    WBC 3.1 2020    HGB 17.7 2020    HCT 54.2 2020    MCV 84.6 2020     2020      Coagulation:   Lab Results   Component Value Date    INR 1.08 2014    APTT 31.9 04/15/2013     Cardiac markers:   No results found for: CKMB, CKTOTAL, TROPONINI, MYOGLOBIN  HgbA1c:  Lab Results   Component Value Date    LABA1C 6.0 2011      Hepatic Profile:  No results found for: ALB    Lab Results   Component Value Date    BILITOT 0.40 04/15/2013    AST 48 04/15/2013    ALT 69 04/15/2013    ALKPHOS 74 04/15/2013      Cholesterol:  No results found for: CHOL, HDL, LDLCALC, TRIG   UA:  No results found for: NITRITE, COLORU, PHUR, LABCAST, WBCUA, RBCUA, MUCUS, TRICHOMONAS, YEAST, BACTERIA, CLARITYU, SPECGRAV, LEUKOCYTESUR, UROBILINOGEN, BILIRUBINUR, BLOODU, GLUCOSEU, AMORPHOUS      TESTING/IMAGING  EK2020  NSR     ANGIOGRAM: 2020  LM- Large vessel, 80% left main disease  LAD-Medium size, 50% proximal stenosis, 95% mid stenosis  D1- Medium size, 90% ostial stenosis  D2- Small, patent  Cx- Medium size, 40% ostial stenosis, 40% distal stenosis  OM1- Medium size, patent  OM2- Small, patent RCA- Small-medium size, 90% mid stenosis  RPDA- patent  LVEF- not performed  LVG- not performed  LVEDP- 11  /60     Hemodynamics:  RA- mean 10  RV- 74/10  PA- 74/30 (mean 42)  PCWP 12  TPG 30  AYO 4.4  AO sat 77% on room air  C.O. Thermo 4.91/ CI 2.64  RA sat: 66%  PA Sat: 66%    ECHO/KANIKA: [facility]: 12/1/2020  Left ventricle: The cavity size is normal. Wall thickness is normal. Systolic function was normal. The estimated ejection fraction was in the range of 55% to 60%. Wall motion was normal; there were no regional wall motion abnormalities. Wall motion score: 1.00. Indeterminate evaluation of LV diastolic function. Aorta:   Aortic root:  The aortic root is not dilated. Ascending aorta: The ascending aorta is normal in size. Aortic valve:   Mildly thickened, mildly calcified leaflets. Cusp separation was normal.  Doppler:  Transvalvular velocity is within the normal range. There is no stenosis. No regurgitation. Mitral valve:  Mildly calcified leaflets, . Leaflet separation was normal.  Doppler:  Transvalvular velocity is within the normal range. There is no evidence for stenosis. Trivial  regurgitation. Left atrium:  The atrium is normal in size. Atrial septum:  Agitated saline contrast study at baseline or with provocation, shows no right-to-left atrial  level shunt. Pulmonary artery:  Not well visualized. The main pulmonary artery was normal-sized. Systolic pressure was severely increased, estimated to be 82mm Hg assuming that the right atrial pressure was 3 mmHg. Main pulmonary artery:  Systemic veins:  Inferior vena cava: The vessel was normal in size. Right ventricle: The cavity size is dilated. Wall thickness is increased. Systolic function was normal. TAPSE:   2cm. Tricuspid annular systolic velocity: 08TP/M. Ventricular septum:  Abnormal septal motion. Septal motion is dyssynergic. There is diastolic flattening and systolic flattening. Pulmonic valve:  Not well visualized. Normal thickness leaflets. Mobility was unrestricted. Doppler:  Transvalvular velocity is within the normal range. Trivial regurgitation. Tricuspid valve:  Structurally normal valve. Leaflet separation was normal.  Doppler:  Transvalvular velocity is within the normal range. There is no evidence for stenosis. Mild  regurgitation. Right atrium:   The atrium is moderately dilated. Pericardium:  There is no pericardial effusion. CT HEAD WO contast: [facility]: 12/1/2020  Adequate diagnostic quality. Brain parenchyma: Wedge-shaped hypoattenuation in the right superior parietal lobule extending into the right angular gyrus consistent with remote cortical infarct, and new from the prior MRI. Luna Graven No hemorrhage or mass effect. Ventricles and extraaxial spaces: Proportionate enlargement of ventricles and sulci. No extra-axial fluid collection. Orbits, paranasal sinuses, mastoids: No acute orbital abnormality. Clear paranasal sinuses. Clear mastoid air cells. Extracranial soft tissues: Normal.    Calvarium and skull base: No fracture or suspicious osseous lesion. Other: Atherosclerotic vascular calcifications. CAROTIDS:  [facility]: 12/1/2020  Bilateral: The bilateral CEA is patent with no evidence for significant restenosis.  Bilateral vertebral arteries are patent and antegrade. Conclusions: Patent bilateral CEA. Normal bilateral antegrade vertebral artery flow. Disclaimer: Risk Score calculation is dependent on accuracy of patient problem list and past encounter diagnosis. CTS Adult Cardiac Risk: CABG pending PFTs    Mortality Risk: 1.093%  Renal Failure: 0.500%  Permanent Stroke: 0.578%  Prolonged Ventilation: 7.833%  DSW Infection: 0.379%  Reoperation:4.421%  Morbidity and Mortality: 11.412%  Short Length of Stay: 51.875%  Long Length of Stay: 4.532%      ASSESSMENT AND PLAN:  68-year-old male with multiple medical morbidities heavy nicotine dependence, alcohol dependence and severe peripheral vascular disease status post bilateral carotid endarterectomies, aortobifemoral bypass, left femoral tibial bypass with vein, left SFA endarterectomy, severe pulmonary hypertension who presents with multiple syncopal episodes and was found on cardiac cath have severe three-vessel coronary disease including left main disease. We are asked to evaluate the patient for consideration for surgical revascularization with CABG. Treatment options including not doing nothing and the consequences of that versus medical therapy versus high risk PCI versus surgical revascularization with CABG were discussed with the patient and his wife at the bedside. Best option would be CABG however it is questionable whether the patient  has adequate conduit. During the cath earlier today they were trying to access his right radial artery and it became apparent that this is a very small vessel. I also suspect that his left radial artery could not be used for conduit. His right leg vein has been used for the femoral peroneal bypass. Also the patient has severe pulmonary hypertension and an echo is pending. Will order PFTs to assess pulmonary function. We will also obtain CTA chest abdomen and pelvis to check for ascending aorta calcification and great vessel disease.

## 2020-12-23 NOTE — POST SEDATION
Recommendation  ~Admission for evaluation of pulmonary hypertension and consideration of medical management/CABG/multi-vessel PCI       Shannon Strickland MD 12/23/2020 10:35 AM

## 2020-12-23 NOTE — H&P
HOSPITALISTS HISTORY AND PHYSICAL    12/23/2020 11:23 AM    Patient Information:  Aniyah Roldan is a 63 y.o. male [id number]  PCP:  MERCEDES Edwards (Tel: [phone] )    Chief complaint:  Multivessel CAD    History of Present Illness:  Tommie Lewis is a 63 y.o. male with history of CAD, hypertension, hyperlipidemia, PVD, anxiety, alcohol abuse. Patient initially presented to cardiology clinic to see Dr. Clark Easley on 11/23 with complaints of multiple syncopal episodes and abnormal EKG as noted by PCP. Patient underwent scheduled outpatient cardiac cath today, 12/23 and noted to have multivessel coronary artery disease. Cardiac cath showed multi-vessel CAD. Patient is being admitted with CTVS consult to eval and prepare for possible CABG. In Cath Lab postprocedure, patient is asymptomatic, hemodynamically stable, on 2 L O2 via nasal cannula. History obtained from patient, EMR and cath lab RN. REVIEW OF SYSTEMS:   Constitutional: Negative for fever,chills or night sweats  ENT: Negative for rhinorrhea, epistaxis, hoarseness, sore throat. Respiratory: Negative for shortness of breath,wheezing  Cardiovascular: Negative for chest pain, palpitations   Gastrointestinal: Negative for nausea, vomiting, diarrhea  Genitourinary: Negative for polyuria, dysuria   Hematologic/Lymphatic: Negative for bleeding tendency, easy bruising  Musculoskeletal: Negative for myalgias and arthralgias  Neurologic: Negative for confusion,dysarthria. Skin: Negative for itching,rash  Psychiatric: Negative for depression,anxiety, agitation. Endocrine: Negative for polydipsia,polyuria,heat /cold intolerance. Past Medical History:   has a past medical history of Anxiety, CAD (coronary artery disease), Hyperlipidemia, Hypertension, and PVD (peripheral vascular disease) ([facility] [address]      Past Surgical History: has a past surgical history that includes Appendectomy; Neck surgery; Elbow surgery; other surgical history (11/3/11); vascular surgery; Carotid endarterectomy (4-2013); and Carotid endarterectomy (5/7/13). Medications:  No current facility-administered medications on file prior to encounter. Current Outpatient Medications on File Prior to Encounter   Medication Sig Dispense Refill    diphenhydrAMINE (BENADRYL) 50 MG capsule Take 50 mg by mouth nightly      predniSONE (DELTASONE) 50 MG tablet Take 50mg by mouth 12 hours before procedure, 7 hours before the procedure, and 1 hour before the procedure 3 tablet 3    NIFEdipine (ADALAT CC) 30 MG extended release tablet Take 30 mg by mouth daily      atorvastatin (LIPITOR) 40 MG tablet Take 1 tablet by mouth daily 90 tablet 1    aspirin EC 81 MG EC tablet Take 1 tablet by mouth daily 90 tablet 1    atenolol (TENORMIN) 50 MG tablet Take 3 tablets by mouth daily. (Patient taking differently: Take 50 mg by mouth daily ) 90 tablet 0    niacin (NIASPAN) 500 MG CR tablet Take 1,000 mg by mouth nightly.  alprazolam (XANAX) 1 MG tablet Take 1 mg by mouth daily. Allergies: Allergies   Allergen Reactions    Dye [Iodides] Itching        Social History:  Patient Lives at home with his wife. reports that he has been smoking. He has a 60.00 pack-year smoking history. He has never used smokeless tobacco. He reports current alcohol use of about 8.0 standard drinks of alcohol per week. He reports that he does not use drugs. Family History:  family history includes Diabetes in his father. Physical Exam:  BP (!) 140/78   Pulse 86   Temp 98 °F (36.7 °C)   Resp 16   Ht 5' 7\" (1.702 m)   Wt 164 lb (74.4 kg)   SpO2 97%   BMI 25.69 kg/m²     General appearance:  Appears comfortable. Well nourished  Eyes: Sclera clear, pupils equal  ENT: Moist mucus membranes, no thrush. Trachea midline. Cardiovascular: Regular rhythm, normal S1, S2. No murmur, gallop, rub. No edema in lower extremities  Respiratory: Clear to auscultation bilaterally, no wheeze, good inspiratory effort  Gastrointestinal: Abdomen soft, non-tender, not distended, normal bowel sounds  Musculoskeletal: No cyanosis in digits, neck supple  Neurology: Cranial nerves grossly intact. Alert and oriented in time, place and person. No speech or motor deficits  Psychiatry: Appropriate affect. Not agitated  Skin: Warm, dry, normal turgor, no rash  Brisk capillary refill, peripheral pulses palpable     Labs:  CBC:   Lab Results   Component Value Date    WBC 3.8 12/23/2020    RBC 6.15 12/23/2020    HGB 17.3 12/23/2020    HCT 52.1 12/23/2020    MCV 84.7 12/23/2020    MCH 28.2 12/23/2020    MCHC 33.3 12/23/2020    RDW 18.2 12/23/2020     12/23/2020    MPV 6.9 12/23/2020     BMP:    Lab Results   Component Value Date     12/23/2020    K 4.5 12/23/2020    CL 98 12/23/2020    CO2 22 12/23/2020    BUN 11 12/23/2020    CREATININE 0.6 12/23/2020    CALCIUM 9.5 12/23/2020    GFRAA >60 12/23/2020    GFRAA >60 04/17/2013    LABGLOM >60 12/23/2020    GLUCOSE 173 12/23/2020     No orders to display     I visualized CXR images and EKG strips  Discussed case  with interventional cardiologist.    Problem List  Active Problems:    Abnormal stress test    Unstable angina (HCC)    Pulmonary hypertension ([facility] [address]  Resolved Problems:    * No resolved hospital problems.  *      Cardiac cath on 12/23/2020 by Dr. Maria Antonia Burgos  ~Coronary Angiography w/ Left main, LAD, and RCA disease  ~Moderate pulmonary hypertension (likely group I)  ~Normal left heart filling pressures   Recommendation  ~Admission for evaluation of pulmonary hypertension and consideration of medical management/CABG/multi-vessel PCI        Assessment/Plan:     Multi-vessel CAD: CT surgery consult as per Cardiology, Dr. Clark Easley for possible CABG. patient is currently asymptomatic. No indication for anticoagulation of nitro drip as per discussion with cardiology. Holding aspirin. Resumed on statin. History of hypertension:  Resumed on home medication, nifedipine. Currently controlled. History of hyperlipidemia: Resumed on statin at home dose. History of PVD: Holding aspirin for possible CABG. Educate on tobacco cessation. History of alcohol abuse: Placed on oral thiamine and multivitamin p.o. daily. [facility] protocol. Case management consult for possible rehab after discharge. Active tobacco use disorder: Nicotine patch ordered. DVT prophylaxis Lovenox subcu daily  Code status full code  Diet low-salt, cardiac diet  IV access peripheral IV  Taylor Catheter no    Admit as inpatient. I anticipate hospitalization spanning more than two midnights for investigation and treatment of the above medically necessary diagnoses. Please note that some part of this chart was generated using Dragon dictation software. Although every effort was made to ensure the accuracy of this automated transcription, some errors in transcription may have occurred inadvertently. If you may need any clarification, please do not hesitate to contact me through [facility].        Sushma Velasquez MD    12/23/2020 11:23 AM

## 2020-12-23 NOTE — PROGRESS NOTES
4 Eyes Skin Assessment     NAME:  Prachi York  YOB: 1955  MEDICAL RECORD NUMBER:  [id number]    The patient is being assess for  Admission and post cath    I agree that 2 RN's have performed a thorough Head to Toe Skin Assessment on the patient. ALL assessment sites listed below have been assessed. Areas assessed by both nurses:    Head, Face, Ears, Shoulders, Back, Chest, Arms, Elbows, Hands, Sacrum. Buttock, Coccyx, Ischium, Legs. Feet and Heels and Other extremities         Does the Patient have a Wound?  No noted wound(s)       Angel Prevention initiated:  Yes   Wound Care Orders initiated:  NA    Pressure Injury (Stage 3,4, Unstageable, DTI, NWPT, and Complex wounds) if present place consult order under [de-identified] NA    New and Established Ostomies if present place consult order under : NA      Nurse 1 eSignature: Electronically signed by Juana Melara RN on 12/23/20 at 5:38 PM EST    **SHARE this note so that the co-signing nurse is able to place an eSignature**    Nurse 2 eSignature: Electronically signed by CARLOS Arias CNP on 12/23/20 at 5:44 PM EST

## 2020-12-23 NOTE — H&P
Department of Cardiology   History and Physical      CHIEF COMPLAINT:  Syncope, shortness of breath, positive stress test  History Obtained From:  patient, spouse    HISTORY OF PRESENT ILLNESS:      The patient is a 63 y.o. male with significant past medical history of smoking and peripheral vascular disease who presented with shortness of breath, bilateral arm pain, and syncope. He underwent a stress test that was abnormal for LV dysfunction and pulmonary hypertension. Patient was pretreated with steroids and has not taken xarelto. Past Medical History:        Diagnosis Date    Anxiety     CAD (coronary artery disease)     Hyperlipidemia     Hypertension     PVD (peripheral vascular disease) ([facility] [address]      Past Surgical History:        Procedure Laterality Date    APPENDECTOMY      CAROTID ENDARTERECTOMY  4-2013    right    CAROTID ENDARTERECTOMY  5/7/13    left    ELBOW SURGERY      NECK SURGERY      OTHER SURGICAL HISTORY  11/3/11    left external iliac artery stenting, left common and deep femoral artery endarterectomy with lower peritoneal patch    VASCULAR SURGERY      left femoral endarterectomy       Medications Prior to Admission:    Medications Prior to Admission: diphenhydrAMINE (BENADRYL) 50 MG capsule, Take 50 mg by mouth nightly  predniSONE (DELTASONE) 50 MG tablet, Take 50mg by mouth 12 hours before procedure, 7 hours before the procedure, and 1 hour before the procedure  NIFEdipine (ADALAT CC) 30 MG extended release tablet, Take 30 mg by mouth daily  atorvastatin (LIPITOR) 40 MG tablet, Take 1 tablet by mouth daily  aspirin EC 81 MG EC tablet, Take 1 tablet by mouth daily  atenolol (TENORMIN) 50 MG tablet, Take 3 tablets by mouth daily. (Patient taking differently: Take 50 mg by mouth daily )  niacin (NIASPAN) 500 MG CR tablet, Take 1,000 mg by mouth nightly. alprazolam (XANAX) 1 MG tablet, Take 1 mg by mouth daily.       Allergies:  Dye [iodides]    Social History:   TOBACCO:  current Family History:       Problem Relation Age of Onset    Diabetes Father      REVIEW OF SYSTEMS:  12 point review of systems negative except for mentioned in HPI  PHYSICAL EXAM:    Vitals:  BP (!) 140/78   Pulse 86   Temp 98 °F (36.7 °C)   Resp 16   Ht 5' 7\" (1.702 m)   Wt 164 lb (74.4 kg)   SpO2 97%   BMI 25.69 kg/m²     CONSTITUTIONAL:  awake, alert, cooperative, no apparent distress, and appears stated age  LUNGS:  No increased work of breathing, good air exchange, clear to auscultation bilaterally, no crackles or wheezing  CARDIOVASCULAR:  Normal apical impulse, regular rate and rhythm, normal S1 and S2, no S3 or S4, and no murmur noted  ABDOMEN:  No scars, normal bowel sounds, soft, non-distended, non-tender, no masses palpated, no hepatosplenomegally  SKIN:  no bruising or bleeding    1. Abnormal stress test and pulmonary hypertension. Based on these findings I recommend left heart cath and right heart cath for definitive evaluation of coronary arteries and pulmonary hypertension. Risks, benefits, expectations, and alternative treatments were discussed. Questions appropriately answered. Powell Balwinder agrees to proceed and verbalized understanding.

## 2020-12-23 NOTE — PRE SEDATION
Brief Pre-Op Note/Sedation Assessment      Vika Dalton  1955  Cath/NONE      [id number]  7:54 AM    Planned Procedure: Cardiac Catheterization Procedure    Post Procedure Plan: Return to same level of care    Consent: I have discussed with the patient and/or the patient representative the indication, alternatives, and the possible risks and/or complications of the planned procedure and the anesthesia methods. The patient and/or patient representative appear to understand and agree to proceed. Discussed risks, benefits, and alternatives of the procedure, including stroke, heart attack, death, vascular injury, damage to nerves/arteries/veins, anaphylaxis. Chief Complaint: Chest Pain/Pressure  Dyspnea on Exertion      Indications for Cath Procedure:  New Onset Angina <= 2 months and Syncope  Anginal Classification within 2 weeks:  CCS III - Symptoms with everyday living activities, i.e., moderate limitation  NYHA Heart Failure Class within 2 weeks: No symptoms  Is Cath Lab Visit Valve-related?: No  Surgical Risk: Intermediate  Functional Type: >= 4 METS with symptoms    Anti- Anginal Meds within 2 weeks:   Yes: Beta Blockers and Ca Channel Blockers    Stress or Imaging Studies Performed:  Stress Echo Result: Positive:  LVEF drop, concerning for global ischemia    Vital Signs:  BP (!) 140/78   Pulse 86   Temp 98 °F (36.7 °C)   Resp 16   Ht 5' 7\" (1.702 m)   Wt 164 lb (74.4 kg)   SpO2 97%   BMI 25.69 kg/m²     Allergies:   Allergies   Allergen Reactions    Dye [Iodides] Itching       Past Medical History:  Past Medical History:   Diagnosis Date    Anxiety     CAD (coronary artery disease)     Hyperlipidemia     Hypertension     PVD (peripheral vascular disease) ([facility] [address]          Surgical History:  Past Surgical History:   Procedure Laterality Date    APPENDECTOMY      CAROTID ENDARTERECTOMY  4-2013    right    CAROTID ENDARTERECTOMY  5/7/13    left    ELBOW SURGERY      NECK SURGERY  OTHER SURGICAL HISTORY  11/3/11    left external iliac artery stenting, left common and deep femoral artery endarterectomy with lower peritoneal patch    VASCULAR SURGERY      left femoral endarterectomy         Medications:  No current facility-administered medications for this encounter. Pre-Sedation:    Pre-Sedation Documentation and Exam:  I have personally completed a history, physical exam & review of systems for this patient (see notes). Prior History of Anesthesia Complications:   none    Modified Mallampati:  II (soft palate, uvula, fauces visible)    ASA Classification:  Class 3 - A patient with severe systemic disease that limits activity but is not incapacitating    Stephon Scale: Activity:  2 - Able to move 4 extremities voluntarily on command  Respiration:  2 - Able to breathe deeply and cough freely  Circulation:  2 - BP+/- 20mmHg of normal  Consciousness:  2 - Fully awake  Oxygen Saturation (color):  2 - Able to maintain oxygen saturation >92% on room air    Sedation/Anesthesia Plan:  Guard the patient's safety and welfare. Minimize physical discomfort and pain. Minimize negative psychological responses to treatment by providing sedation and analgesia and maximize the potential amnesia. Patient to meet pre-procedure discharge plan.     Medication Planned:  midazolam intravenously and fentanyl intravenously    Patient is an appropriate candidate for plan of sedation: yes    Electronically signed by Antionette Chawla DO on 12/23/2020 at 7:54 AM

## 2020-12-24 VITALS
SYSTOLIC BLOOD PRESSURE: 121 MMHG | WEIGHT: 145.72 LBS | HEIGHT: 67 IN | RESPIRATION RATE: 18 BRPM | BODY MASS INDEX: 22.87 KG/M2 | DIASTOLIC BLOOD PRESSURE: 69 MMHG | HEART RATE: 83 BPM | TEMPERATURE: 97.7 F | OXYGEN SATURATION: 93 %

## 2020-12-24 PROBLEM — I63.9 CVA (CEREBRAL VASCULAR ACCIDENT) (HCC): Status: ACTIVE | Noted: 2020-12-01

## 2020-12-24 PROBLEM — R06.02 SHORTNESS OF BREATH: Status: ACTIVE | Noted: 2020-12-24

## 2020-12-24 PROBLEM — I25.5 ISCHEMIC CARDIOMYOPATHY: Status: ACTIVE | Noted: 2020-12-24

## 2020-12-24 LAB
A/G RATIO: 0.8 (ref 1.1–2.2)
ALBUMIN SERPL-MCNC: 3.7 G/DL (ref 3.4–5)
ALP BLD-CCNC: 161 U/L (ref 40–129)
ALT SERPL-CCNC: 23 U/L (ref 10–40)
AMMONIA: 35 UMOL/L (ref 16–60)
AMYLASE: 37 U/L (ref 25–115)
ANION GAP SERPL CALCULATED.3IONS-SCNC: 13 MMOL/L (ref 3–16)
AST SERPL-CCNC: 41 U/L (ref 15–37)
BILIRUB SERPL-MCNC: 1 MG/DL (ref 0–1)
BUN BLDV-MCNC: 13 MG/DL (ref 7–20)
CALCIUM SERPL-MCNC: 10 MG/DL (ref 8.3–10.6)
CHLORIDE BLD-SCNC: 100 MMOL/L (ref 99–110)
CO2: 23 MMOL/L (ref 21–32)
CREAT SERPL-MCNC: 0.5 MG/DL (ref 0.8–1.3)
EKG ATRIAL RATE: 86 BPM
EKG DIAGNOSIS: NORMAL
EKG P AXIS: 68 DEGREES
EKG P-R INTERVAL: 178 MS
EKG Q-T INTERVAL: 412 MS
EKG QRS DURATION: 96 MS
EKG QTC CALCULATION (BAZETT): 493 MS
EKG R AXIS: 106 DEGREES
EKG T AXIS: 43 DEGREES
EKG VENTRICULAR RATE: 86 BPM
GFR AFRICAN AMERICAN: >60
GFR NON-AFRICAN AMERICAN: >60
GLOBULIN: 4.9 G/DL
GLUCOSE BLD-MCNC: 123 MG/DL (ref 70–99)
HAV IGM SER IA-ACNC: NORMAL
HCT VFR BLD CALC: 55.8 % (ref 40.5–52.5)
HEMOGLOBIN: 18.5 G/DL (ref 13.5–17.5)
HEPATITIS B CORE IGM ANTIBODY: NORMAL
HEPATITIS B SURFACE ANTIGEN INTERPRETATION: NORMAL
HEPATITIS C ANTIBODY INTERPRETATION: NORMAL
LACTIC ACID: 0.9 MMOL/L (ref 0.4–2)
LIPASE: 27 U/L (ref 13–60)
LV EF: 30 %
LVEF MODALITY: NORMAL
MCH RBC QN AUTO: 28.5 PG (ref 26–34)
MCHC RBC AUTO-ENTMCNC: 33.1 G/DL (ref 31–36)
MCV RBC AUTO: 86 FL (ref 80–100)
PDW BLD-RTO: 18.8 % (ref 12.4–15.4)
PLATELET # BLD: 225 K/UL (ref 135–450)
PMV BLD AUTO: 6.8 FL (ref 5–10.5)
POTASSIUM REFLEX MAGNESIUM: 3.8 MMOL/L (ref 3.5–5.1)
RBC # BLD: 6.49 M/UL (ref 4.2–5.9)
SODIUM BLD-SCNC: 136 MMOL/L (ref 136–145)
TOTAL PROTEIN: 8.6 G/DL (ref 6.4–8.2)
TSH SERPL DL<=0.05 MIU/L-ACNC: 3.35 UIU/ML (ref 0.27–4.2)
WBC # BLD: 9.9 K/UL (ref 4–11)

## 2020-12-24 PROCEDURE — 85027 COMPLETE CBC AUTOMATED: CPT

## 2020-12-24 PROCEDURE — 80053 COMPREHEN METABOLIC PANEL: CPT

## 2020-12-24 PROCEDURE — 99292 CRITICAL CARE ADDL 30 MIN: CPT | Performed by: INTERNAL MEDICINE

## 2020-12-24 PROCEDURE — 80074 ACUTE HEPATITIS PANEL: CPT

## 2020-12-24 PROCEDURE — 93306 TTE W/DOPPLER COMPLETE: CPT

## 2020-12-24 PROCEDURE — 6370000000 HC RX 637 (ALT 250 FOR IP): Performed by: INTERNAL MEDICINE

## 2020-12-24 PROCEDURE — 99291 CRITICAL CARE FIRST HOUR: CPT | Performed by: INTERNAL MEDICINE

## 2020-12-24 PROCEDURE — 84443 ASSAY THYROID STIM HORMONE: CPT

## 2020-12-24 PROCEDURE — APPNB30 APP NON BILLABLE TIME 0-30 MINS: Performed by: NURSE PRACTITIONER

## 2020-12-24 PROCEDURE — 83605 ASSAY OF LACTIC ACID: CPT

## 2020-12-24 PROCEDURE — 6360000002 HC RX W HCPCS: Performed by: INTERNAL MEDICINE

## 2020-12-24 PROCEDURE — 82150 ASSAY OF AMYLASE: CPT

## 2020-12-24 PROCEDURE — APPSS15 APP SPLIT SHARED TIME 0-15 MINUTES: Performed by: NURSE PRACTITIONER

## 2020-12-24 PROCEDURE — 93005 ELECTROCARDIOGRAM TRACING: CPT | Performed by: INTERNAL MEDICINE

## 2020-12-24 PROCEDURE — 83690 ASSAY OF LIPASE: CPT

## 2020-12-24 PROCEDURE — 83036 HEMOGLOBIN GLYCOSYLATED A1C: CPT

## 2020-12-24 PROCEDURE — 82140 ASSAY OF AMMONIA: CPT

## 2020-12-24 PROCEDURE — 2580000003 HC RX 258: Performed by: INTERNAL MEDICINE

## 2020-12-24 PROCEDURE — 93010 ELECTROCARDIOGRAM REPORT: CPT | Performed by: INTERNAL MEDICINE

## 2020-12-24 RX ORDER — ALPRAZOLAM 1 MG/1
1 TABLET ORAL DAILY
Qty: 30 TABLET | Refills: 0 | Status: SHIPPED | OUTPATIENT
Start: 2020-12-25 | End: 2021-01-24

## 2020-12-24 RX ORDER — GAUZE BANDAGE 2" X 2"
100 BANDAGE TOPICAL DAILY
Qty: 30 TABLET | Refills: 0
Start: 2020-12-25 | End: 2021-01-07

## 2020-12-24 RX ORDER — POTASSIUM CHLORIDE 20 MEQ/1
40 TABLET, EXTENDED RELEASE ORAL PRN
Qty: 60 TABLET | Refills: 0
Start: 2020-12-24 | End: 2021-01-07

## 2020-12-24 RX ORDER — POLYETHYLENE GLYCOL 3350 17 G/17G
17 POWDER, FOR SOLUTION ORAL DAILY PRN
Qty: 527 G | Refills: 0
Start: 2020-12-24 | End: 2021-01-07

## 2020-12-24 RX ORDER — CARVEDILOL 6.25 MG/1
6.25 TABLET ORAL 2 TIMES DAILY WITH MEALS
Status: DISCONTINUED | OUTPATIENT
Start: 2020-12-24 | End: 2020-12-25 | Stop reason: HOSPADM

## 2020-12-24 RX ORDER — VALSARTAN 40 MG/1
40 TABLET ORAL DAILY
Qty: 30 TABLET | Refills: 0
Start: 2020-12-24 | End: 2021-01-07

## 2020-12-24 RX ORDER — NICOTINE 21 MG/24HR
1 PATCH, TRANSDERMAL 24 HOURS TRANSDERMAL DAILY
Qty: 30 PATCH | Refills: 0
Start: 2020-12-25

## 2020-12-24 RX ORDER — VALSARTAN 80 MG/1
40 TABLET ORAL DAILY
Status: DISCONTINUED | OUTPATIENT
Start: 2020-12-24 | End: 2020-12-25 | Stop reason: HOSPADM

## 2020-12-24 RX ORDER — ASPIRIN 81 MG/1
81 TABLET, CHEWABLE ORAL DAILY
Qty: 30 TABLET | Refills: 0
Start: 2020-12-25

## 2020-12-24 RX ORDER — MULTIVITAMIN WITH IRON
1 TABLET ORAL DAILY
Qty: 30 TABLET | Refills: 0
Start: 2020-12-25

## 2020-12-24 RX ORDER — LORAZEPAM 1 MG/1
1 TABLET ORAL
Qty: 30 TABLET | Refills: 0
Start: 2020-12-24 | End: 2021-01-23

## 2020-12-24 RX ORDER — PROMETHAZINE HYDROCHLORIDE 12.5 MG/1
12.5 TABLET ORAL EVERY 6 HOURS PRN
Qty: 30 TABLET | Refills: 0
Start: 2020-12-24 | End: 2020-12-31

## 2020-12-24 RX ORDER — NIACIN 1000 MG/1
1000 TABLET, EXTENDED RELEASE ORAL NIGHTLY
Qty: 30 TABLET | Refills: 0 | Status: SHIPPED | OUTPATIENT
Start: 2020-12-24

## 2020-12-24 RX ORDER — CARVEDILOL 6.25 MG/1
6.25 TABLET ORAL 2 TIMES DAILY WITH MEALS
Qty: 60 TABLET | Refills: 0
Start: 2020-12-24 | End: 2021-01-07

## 2020-12-24 RX ADMIN — NIFEDIPINE 30 MG: 30 TABLET, FILM COATED, EXTENDED RELEASE ORAL at 08:37

## 2020-12-24 RX ADMIN — ASPIRIN 81 MG: 81 TABLET, CHEWABLE ORAL at 08:38

## 2020-12-24 RX ADMIN — LORAZEPAM 1 MG: 1 TABLET ORAL at 11:29

## 2020-12-24 RX ADMIN — LORAZEPAM 1 MG: 1 TABLET ORAL at 18:22

## 2020-12-24 RX ADMIN — THERA TABS 1 TABLET: TAB at 14:13

## 2020-12-24 RX ADMIN — METOPROLOL TARTRATE 25 MG: 25 TABLET, FILM COATED ORAL at 08:38

## 2020-12-24 RX ADMIN — ALPRAZOLAM 1 MG: 0.5 TABLET ORAL at 08:37

## 2020-12-24 RX ADMIN — Medication 10 ML: at 11:31

## 2020-12-24 RX ADMIN — ENOXAPARIN SODIUM 70 MG: 80 INJECTION SUBCUTANEOUS at 19:17

## 2020-12-24 RX ADMIN — LORAZEPAM 1 MG: 1 TABLET ORAL at 16:43

## 2020-12-24 RX ADMIN — ATORVASTATIN CALCIUM 40 MG: 40 TABLET, FILM COATED ORAL at 16:43

## 2020-12-24 RX ADMIN — Medication 100 MG: at 14:14

## 2020-12-24 RX ADMIN — ENOXAPARIN SODIUM 40 MG: 40 INJECTION SUBCUTANEOUS at 11:29

## 2020-12-24 ASSESSMENT — PAIN SCALES - GENERAL
PAINLEVEL_OUTOF10: 0
PAINLEVEL_OUTOF10: 0

## 2020-12-24 NOTE — PROGRESS NOTES
Resting in bed. States, \"I think I'm just going to take medication instead of getting a stent or having surgery, I don't want to go through another surgery'.

## 2020-12-24 NOTE — CARE COORDINATION
Patient admitted as Observation/OPIB or Inpatient status with a Readmission Risk Score of 18% or less. with an anticipated short hospitalization length of stay. Chart reviewed and patient lives with spouse and in private residence. The patient is independent in ADLs and IADLs and has presented with no needs for discharge at this time. Discussed with patient's nurse and requested that case management be notified if discharge needs are identified. Case Management will continue to follow progress and update discharge plan as needed.     Claudell Humble BSN, RN  RN Case Manager  [phone]

## 2020-12-24 NOTE — PROGRESS NOTES
[facility]    12/24/2020 8:47 AM        Name: Tino Quintanilla . Admitted: 12/23/2020  Primary Care Provider: MERCEDES Allison (Tel: [phone])    Brief Course:  Tino Quintanilla is a 63 y.o. male with history of CAD, hypertension, hyperlipidemia, PVD, anxiety, alcohol abuse. Patient initially presented to cardiology clinic to see Dr. Carole Aguilar on 11/23 with complaints of multiple syncopal episodes and abnormal EKG as noted by PCP. Patient underwent scheduled outpatient cardiac cath today, 12/23 and noted to have multivessel coronary artery disease. Cardiac cath showed multi-vessel CAD. Patient is being admitted with CTVS consult to eval and prepare for possible CABG. In Cath Lab postprocedure, patient is asymptomatic, hemodynamically stable, on 2 L O2 via nasal cannula. History obtained from patient, EMR and cath lab RN.      CC: Outpatient cardiac cath  Subjective:  Pt offers no complaints. Reports that he would like to get the cath and PCI as outpt \"after discussing with his PCP and neurologist\".  Agreeable to wait and discuss with cardiologist.     Reviewed interval ancillary notes    Current Medications      ALPRAZolam (XANAX) tablet 1 mg, Daily      atorvastatin (LIPITOR) tablet 40 mg, Daily      niacin (NIASPAN) extended release tablet 1,000 mg, Nightly      sodium chloride flush 0.9 % injection 10 mL, 2 times per day      sodium chloride flush 0.9 % injection 10 mL, PRN      promethazine (PHENERGAN) tablet 12.5 mg, Q6H PRN    Or      ondansetron (ZOFRAN) injection 4 mg, Q6H PRN      acetaminophen (TYLENOL) tablet 650 mg, Q6H PRN    Or      acetaminophen (TYLENOL) suppository 650 mg, Q6H PRN      polyethylene glycol (GLYCOLAX) packet 17 g, Daily PRN      potassium chloride (KLOR-CON M) extended release tablet 40 mEq, PRN    Or Respiratory: Clear to auscultation bilaterally, no wheeze, good inspiratory effort  Gastrointestinal: Abdomen soft, non-tender, not distended, normal bowel sounds  Musculoskeletal: No cyanosis in digits, neck supple  Neurology: Cranial nerves grossly intact. Alert and oriented in time, place and person. No speech or motor deficits  Brisk capillary refill, peripheral pulses palpable     Labs and Tests:  CBC:   Recent Labs     12/23/20  0738 12/23/20  1018 12/24/20  0545   WBC 3.1* 3.8* 9.9   HGB 17.7* 17.3 18.5*    220 225     BMP:    Recent Labs     12/23/20  0738 12/23/20  1018 12/24/20  0545   * 130* 136   K 4.4 4.5 3.8   CL 92* 98* 100   CO2 22 22 23   BUN 12 11 13   CREATININE 0.9 0.6* 0.5*   GLUCOSE 203* 173* 123*     Hepatic:   Recent Labs     12/23/20  1018 12/24/20  0545   AST 20 41*   ALT 14 23   BILITOT 1.4* 1.0   ALKPHOS 159* 161*     XR CHEST PORTABLE   Final Result   Chronic pattern of interstitial change favoring underlying COPD with no acute   pulmonary finding. VL PRE OP VEIN MAPPING             Problem List  Active Problems:    Abnormal stress test    Unstable angina (HCC)    Pulmonary hypertension (HCC)    Multiple vessel coronary artery disease  Resolved Problems:    * No resolved hospital problems. *       Assessment & Plan:     Multi-vessel CAD:   Not a candidate for CABG as per Dr. Aubrey Beckham surgery. Resumed on aspirin and statin. Pulmonary hypertension:  As noted on cardiac cath. CHF team/Dr. Naima Reyna consulted as per Dr. Leena Calix.      History of hypertension:  Resumed on home medication, nifedipine. Currently controlled.     History of hyperlipidemia: Resumed on statin at home dose.     History of PVD: Holding aspirin for possible CABG.   Educate on tobacco cessation.     History of alcohol abuse:

## 2020-12-24 NOTE — CONSULTS
NIFEdipine (ADALAT CC) 30 MG extended release tablet Take 30 mg by mouth daily   Yes Historical Provider, MD   atorvastatin (LIPITOR) 40 MG tablet Take 1 tablet by mouth daily 11/23/20  Yes Juanita Whipple, DO   aspirin EC 81 MG EC tablet Take 1 tablet by mouth daily 11/23/20  Yes Juanita Whipple, DO   atenolol (TENORMIN) 50 MG tablet Take 3 tablets by mouth daily. Patient taking differently: Take 50 mg by mouth daily  5/10/13  Yes Yuri Lima MD   niacin (NIASPAN) 500 MG CR tablet Take 1,000 mg by mouth nightly. Yes Historical Provider, MD   alprazolam Cathlean Keens) 1 MG tablet Take 1 mg by mouth daily.      Yes Historical Provider, MD        Current Medications:  Current Facility-Administered Medications   Medication Dose Route Frequency Provider Last Rate Last Admin    [START ON 12/24/2020] ALPRAZolam (XANAX) tablet 1 mg  1 mg Oral Daily MD Dean Jiang [START ON 12/24/2020] atorvastatin (LIPITOR) tablet 40 mg  40 mg Oral Daily MD Dean Jiang [START ON 12/24/2020] niacin (NIASPAN) extended release tablet 1,000 mg  1,000 mg Oral Nightly Winsome Del Rosario MD        sodium chloride flush 0.9 % injection 10 mL  10 mL Intravenous 2 times per day Winsome Del Rosario MD        sodium chloride flush 0.9 % injection 10 mL  10 mL Intravenous PRN Winsome Del Rosario MD        promethazine (PHENERGAN) tablet 12.5 mg  12.5 mg Oral Q6H PRN Winsome Del Rosario MD        Or    ondansetron (ZOFRAN) injection 4 mg  4 mg Intravenous Q6H PRN Winsome Del Rosario MD        acetaminophen (TYLENOL) tablet 650 mg  650 mg Oral Q6H PRN Winsmoe Del Rosario MD        Or    acetaminophen (TYLENOL) suppository 650 mg  650 mg Rectal Q6H PRN Winsome Del Rosario MD        polyethylene glycol (GLYCOLAX) packet 17 g  17 g Oral Daily PRN Winsome Del Rosario MD        potassium chloride (KLOR-CON M) extended release tablet 40 mEq  40 mEq Oral PRN Winsome Del Rosario MD        Or  potassium bicarb-citric acid (EFFER-K) effervescent tablet 40 mEq  40 mEq Oral PRN Anastasiia Oquendo MD        Or    potassium chloride 10 mEq/100 mL IVPB (Peripheral Line)  10 mEq Intravenous PRN Anastasiia Oquendo MD        magnesium sulfate 2 g in 50 mL IVPB premix  2 g Intravenous PRN Anastasiia Oquendo MD        sodium chloride flush 0.9 % injection 10 mL  10 mL Intravenous 2 times per day Sujatha Ellis DO        sodium chloride flush 0.9 % injection 10 mL  10 mL Intravenous PRN Sujatha Ellis DO        perflutren lipid microspheres (DEFINITY) injection 1.65 mg  1.5 mL Intravenous ONCE PRN Flavio Priest, APRN - CNP        [START ON 12/24/2020] NIFEdipine (PROCARDIA XL) extended release tablet 30 mg  30 mg Oral Daily Anastasiia Oquendo MD        thiamine mononitrate tablet 100 mg  100 mg Oral Daily Anastasiia Oquendo MD   100 mg at 12/23/20 1720    multivitamin 1 tablet  1 tablet Oral Daily Anastasiia Oquendo MD   1 tablet at 12/23/20 1720    LORazepam (ATIVAN) tablet 1 mg  1 mg Oral Q1H PRN Anastasiia Oquendo MD        Or    LORazepam (ATIVAN) injection 1 mg  1 mg Intravenous Q1H PRN Anastasiia Oquendo MD        Or    LORazepam (ATIVAN) tablet 2 mg  2 mg Oral Q1H PRN nAastasiia Oquendo MD        Or    LORazepam (ATIVAN) injection 2 mg  2 mg Intravenous Q1H PRN Anastasiia Oquendo MD        Or    LORazepam (ATIVAN) tablet 3 mg  3 mg Oral Q1H PRN Anastasiia Oquendo MD        Or    LORazepam (ATIVAN) injection 3 mg  3 mg Intravenous Q1H PRN Anastasiia Oquendo MD        Or    LORazepam (ATIVAN) tablet 4 mg  4 mg Oral Q1H PRN Anastasiia Oquendo MD        Or    LORazepam (ATIVAN) injection 4 mg  4 mg Intravenous Q1H PRN Anastasiia Oquendo MD        nicotine (NICODERM CQ) 14 MG/24HR 1 patch  1 patch Transdermal Daily Anastasiia Oquendo MD   1 patch at 12/23/20 1719    [START ON 12/24/2020] enoxaparin (LOVENOX) injection 40 mg  40 mg Subcutaneous Daily Anastasiia Oquendo MD            Allergies:  Dye [iodides]     Review of Systems: · Constitutional: there has been no unanticipated weight loss. +fatigue  · Eyes: No visual changes or diplopia. No scleral icterus. · ENT: No Headaches, hearing loss or vertigo. No mouth sores or sore throat. · Cardiovascular: +shortness of breath, + syncope  · Respiratory: No cough or wheezing, no sputum production. No hematemesis. · Gastrointestinal: No abdominal pain, appetite loss, blood in stools. No change in bowel or bladder habits. · Genitourinary: No dysuria, trouble voiding, or hematuria. · Musculoskeletal:  No gait disturbance, weakness or joint complaints. · Integumentary: No rash or pruritis. · Neurological: No headache, diplopia, change in muscle strength, numbness or tingling. No change in gait, balance, coordination, mood, affect, memory, mentation, behavior. · Psychiatric: No anxiety, no depression. · Endocrine: No malaise, fatigue or temperature intolerance. No excessive thirst, fluid intake, or urination. No tremor. · Hematologic/Lymphatic: No abnormal bruising or bleeding, blood clots or swollen lymph nodes. · Allergic/Immunologic: No nasal congestion or hives.   ·     Physical Examination:    Vitals:    12/23/20 1800   BP: 132/81   Pulse: 85   Resp:    Temp:    SpO2: 90%    Weight: 164 lb (74.4 kg)         General Appearance:  Alert, cooperative, no distress, appears stated age   Head:  Normocephalic, without obvious abnormality, atraumatic   Eyes:  PERRL, conjunctiva/corneas clear       Nose: Nares normal, no drainage or sinus tenderness   Throat: Lips, mucosa, and tongue normal   Neck: Supple, symmetrical, trachea midline, no adenopathy, thyroid: not enlarged, symmetric, Right IJ site clean/dry/intact       Lungs:   Clear to auscultation bilaterally, respirations unlabored   Chest Wall:  No tenderness or deformity   Heart:  Regular rate and rhythm, S1, S2 normal, no murmur, rub or gallop Abdomen:   Soft, non-tender, bowel sounds active all four quadrants,  no masses, no organomegaly           Extremities: Extremities normal, right femoral site clean/dry/intact, soft, no hematoma, nontender   Pulses: 1+ and symmetric   Skin: Skin color, texture, turgor normal, no rashes or lesions   Pysch: Normal mood and affect   Neurologic: Normal gross motor and sensory exam.         Labs  CBC:   Lab Results   Component Value Date    WBC 3.8 12/23/2020    RBC 6.15 12/23/2020    HGB 17.3 12/23/2020    HCT 52.1 12/23/2020    MCV 84.7 12/23/2020    RDW 18.2 12/23/2020     12/23/2020     CMP:    Lab Results   Component Value Date     12/23/2020    K 4.5 12/23/2020    CL 98 12/23/2020    CO2 22 12/23/2020    BUN 11 12/23/2020    CREATININE 0.6 12/23/2020    GFRAA >60 12/23/2020    GFRAA >60 04/17/2013    AGRATIO 0.9 12/23/2020    LABGLOM >60 12/23/2020    GLUCOSE 173 12/23/2020    PROT 8.1 12/23/2020    CALCIUM 9.5 12/23/2020    BILITOT 1.4 12/23/2020    ALKPHOS 159 12/23/2020    AST 20 12/23/2020    ALT 14 12/23/2020     PT/INR:  No results found for: PTINR  No results found for: CKTOTAL, CKMB, CKMBINDEX, TROPONINI    EKG:  I have reviewed EKG with the following interpretation:  Impression:  Sinus rhythm, rvh  Stress echo 12/3/20 ([facility])   Left ventricle: The cavity size is normal. Wall thickness is normal. Systolic function was normal.    The estimated ejection fraction was in the range of 55% to 60%. Wall motion was normal; there were    no regional wall motion abnormalities. Indeterminate evaluation of LV diastolic function. - Ventricular septum: Abnormal septal motion. Septal motion is dyssynergic.  - Right ventricle: The cavity size is dilated. Wall thickness is increased. Systolic function was    normal. TAPSE: 2cm. Tricuspid annular systolic velocity: 45RY/B.  - Right atrium: The atrium is moderately dilated. - Pulmonary arteries: Systolic pressure was severely increased, estimated to be 82mm Hg assuming    that the right atrial pressure was 3 mmHg. - Stress ECG conclusions: There were no stress arrhythmias or conduction abnormalities. The stress    ECG was negative for ischemia. Impressions:    - Abnormal study after maximal exercise. - Imaging at peak stress is inadequate to evaluate for regional wall motion abnormalities, however,    overall left ventricular function at peak stress is reduced compared to resting images concerning    for ischemic stress response. - The PA systolic pressure is severely elevated. Cath personally reviewed: Anatomy:   LM- Large vessel, 80% left main disease  LAD-Medium size, 50% proximal stenosis, 95% mid stenosis  D1- Medium size, 90% ostial stenosis  D2- Small, patent  Cx- Medium size, 40% ostial stenosis, 40% distal stenosis  OM1- Medium size, patent  OM2- Small, patent  RCA- Small-medium size, 90% mid stenosis  RPDA- patent  LVEF- not performed  LVG- not performed  LVEDP- 11  /60     Hemodynamics:  RA- mean 10  RV- 74/10  PA- 74/30 (mean 42)  PCWP 12  TPG 30  AYO 4.4  AO sat 77% on room air  C.O.  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 Assessment/Plan:  1. Multivessel coronary artery disease  - new problem  Plan:  - continue aspirin and statin  - stop atenolol  - start metoprolol t 25 mg bid  - echocardiogram  - CT surgery consult for CABG. Anticipate turndown. Consider PCI    2. Pulmonary hypertension and chronic respiratory failure  - new problem  - likely group 3, could be group 1. Plan:  - heart failure consult for potential pulmonary hypertension treatment. Will need workup  - PFTs    3. Alcohol use and nicotine dependence  - new problem  Plan:  - patient counseled on smoking cessation  - alcohol withdrawal protocol       I will address the patient's cardiac risk factors and adjusted pharmacologic treatment as needed. In addition, I have reinforced the need for patient directed risk factor modification. Tobacco use was discussed with the patient and educated on the negative effects. I have asked the patient to not utilize these agents. All questions and concerns were addressed to the patient/family. Alternatives to my treatment were discussed. The note was completed using EMR. Every effort was made to ensure accuracy; however, inadvertent computerized transcription errors may be present.   Sandip Rice MD 12/23/2020 7:54 PM

## 2020-12-24 NOTE — PROGRESS NOTES
[address] Daily Progress Note      Admit Date:  12/23/2020    Chief Complaint: CAD    Subjective:  Mr. James Rose denies chest discomfort or shortness of breath currently. He is wondering what the next steps are in regards to his care.     Objective:   /69   Pulse 83   Temp 97.7 °F (36.5 °C) (Temporal)   Resp 18   Ht 5' 7\" (1.702 m)   Wt 145 lb 11.6 oz (66.1 kg)   SpO2 93%   BMI 22.82 kg/m²       Intake/Output Summary (Last 24 hours) at 12/24/2020 1819  Last data filed at 12/24/2020 8105  Gross per 24 hour   Intake 980 ml   Output    Net 980 ml       TELEMETRY: Sinus     Physical Exam:  General:  Awake, alert, oriented x 3, NAD  Skin:  Warm and dry  Neck:  JVD normal  Chest:  decreased air exchange bilaterally  Cardiovascular:  RRR S1S2, no S3, no significant murmur  Abdomen:  Soft, ND, NT, No HSM  Extremities:  No edema    Medications:    carvedilol  6.25 mg Oral BID WC    valsartan  40 mg Oral Daily    ALPRAZolam  1 mg Oral Daily    atorvastatin  40 mg Oral Daily    niacin  1,000 mg Oral Nightly    sodium chloride flush  10 mL Intravenous 2 times per day    sodium chloride flush  10 mL Intravenous 2 times per day    thiamine mononitrate  100 mg Oral Daily    multivitamin  1 tablet Oral Daily    nicotine  1 patch Transdermal Daily    enoxaparin  40 mg Subcutaneous Daily    aspirin  81 mg Oral Daily    perflutren lipid microspheres  1.5 mL Intravenous Once       sodium chloride flush, promethazine **OR** ondansetron, acetaminophen **OR** acetaminophen, polyethylene glycol, potassium chloride **OR** potassium alternative oral replacement **OR** potassium chloride, magnesium sulfate, sodium chloride flush, perflutren lipid microspheres, LORazepam **OR** LORazepam **OR** LORazepam **OR** LORazepam **OR** LORazepam **OR** LORazepam **OR** LORazepam **OR** LORazepam    Lab Data:  CBC:   Recent Labs     12/23/20  0738 12/23/20  1018 12/24/20  0545   WBC 3.1* 3.8* 9.9 HGB 17.7* 17.3 18.5*   HCT 54.2* 52.1 55.8*   MCV 84.6 84.7 86.0    220 225     BMP:   Recent Labs     12/23/20  0738 12/23/20  1018 12/24/20  0545   * 130* 136   K 4.4 4.5 3.8   CL 92* 98* 100   CO2 22 22 23   BUN 12 11 13   CREATININE 0.9 0.6* 0.5*     LIVER PROFILE:   Recent Labs     12/23/20  1018 12/24/20  0545   AST 20 41*   ALT 14 23   LIPASE  --  27.0   BILITOT 1.4* 1.0   ALKPHOS 159* 161*     PT/INR: No results for input(s): PROTIME, INR in the last 72 hours. APTT: No results for input(s): APTT in the last 72 hours. BNP:  No results for input(s): BNP in the last 72 hours. Imaging/Procedures:   Echo 12/24/2020:   Summary    -Severely reduced global systolic function with an ejection fraction    estimated at 30%.  -Global hypokinesis with severe septal hypokinesis noted.    -The right ventricle is severely dilated and hypokinetic. RV systolic    function appears to be significantly reduced.    -The right atrium is mildly enlarged.    -Aortic valve appears sclerotic but opens adequately. No significant    regurgitation noted.    -Mildly thickened mitral valve without evidence of stenosis. There is    trivial mitral regurgitation noted.    -There is moderate tricuspid regurgitation with a RVSP estimation of 67 mmHg    that was obtained during bubble study. This is suggestive of severe    pulmonary hypertension.    -Grade I diastolic dysfunction with normal LV filling pressures. Avg.    E/e'=22.9    -A bubble study was performed and fails to show evidence of shunting. Cardiac cath 12/23/2020:  Cardiac Cath :   Anatomy:   LM- Large vessel, 80% left main disease  LAD-Medium size, 50% proximal stenosis, 95% mid stenosis  D1- Medium size, 90% ostial stenosis  D2- Small, patent  Cx- Medium size, 40% ostial stenosis, 40% distal stenosis  OM1- Medium size, patent  OM2- Small, patent  RCA- Small-medium size, 90% mid stenosis  RPDA- patent  LVEF- not performed  LVG- not performed  LVEDP- 11 - Imaging at peak stress is inadequate to evaluate for regional wall motion abnormalities, however, overall left ventricular function at peak stress is reduced compared to resting images concerning  for ischemic stress response. - The PA systolic pressure is severely elevated. CT head without contrast 12/1/2020:  IMPRESSION:    1.  Remote right parietal cortical infarct, new from the 2012 MRI. No acute intracranial abnormality. Assessment/Plan:  Principal Problem:    Syncope  Plan: Etiology unclear. According to wife's history, seems to occur after some type of exertion. Severe left main and RCA disease likely contributing. Also evidence of LV systolic dysfunction. Did have evidence of remote CVA as evidenced by CT head at [facility] 12/1/2020. Active Problems:    Abnormal stress test  Plan: Abnormal stress echo. Severe CAD noted on cardiac cath 12/23/2020. Unstable angina (HCC)  Plan: Secondary to severe CAD. Pulmonary hypertension ([address]  Plan: Severe by echo and right heart cath. Chest x-ray suggests COPD. Echo with RV dilatation and systolic dysfunction. This would be extremely concerning in regards to potential revascularization of CAD with CABG. Multiple vessel coronary artery disease  Plan: Severe CAD. Weighing options of revascularization. Turned down by CV surgical service here at [facility].  Consideration for PCI of left main being evaluated however would be extremely high risk with questions as to whether device support can be placed-i.e. Impella due to PAD. Add Lovenox; can be transition to IV heparin at [facility] if desired       Ischemic cardiomyopathy  Plan: Echo reduced from baseline echo at the time of stress echo 12/3/2020. Severe CAD noted. Change metoprolol to carvedilol. Add valsartan. Discontinue nifedipine. Titrate carvedilol and valsartan as hemodynamics allow.       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 Prior aortobifemoral bypass graft. Shortness of breath  Plan: Multifactorial.  Needs further evaluation. Essential hypertension  Plan: Stable. Hyperlipidemia  Plan: At goal.  Continue statin therapy. Tobacco abuse  Plan: Smoking cessation discussed. After review of his echo and cardiac cath films, as well as discussion with CV surgical service here at [facility] and at [facility], recommend transfer for a second CV surgical/heart team opinion. Discussed the options with the patient and his wife in detail. Good and pertinent questions have been asked and answered. They are agreeable to transfer. [facility] has accepted the patient. Transfer arrangements being made. Will start Lovenox 1 mg/kg subcu q. 12 which can be continued or converted to IV heparin by [facility]. Mayela Barney MD 12/24/2020 6:19 PM    Critical care time: 90 minutes.

## 2020-12-24 NOTE — PROGRESS NOTES
Cardiothoracic Surgery Progress Note    CC: Left main and multivessel coronary artery disease     Subjective:  Hemodynamically stable, on 3 liters nasal cannula, afebrile. Alert and oriented X 3. Patient denying any complaints of pain or shortness of breath. Vital Signs:   Vitals:    12/24/20 0837   BP: (!) 158/87   Pulse: 83   Resp: 158/87   Temp: 98.3   SpO2: 93 on 3 liters nc      Gtts:     Physical Exam:   Cardiac:  regular rate and rhythm, S1, S2 normal, no murmur, click, rub or gallop  Lungs: lungs with  crackles bases bilaterally, no accessory muscle use  Abdomen:   abdomen is soft without significant tenderness, masses, organomegaly or guarding  Vascular:  Right radial, bilateral DP and PT pulses doppler only  Extremities: warm and well perfused, no signs of cyanosis or ischemia, no significant edema, bilateral upper and lower extremity motorsensory intact  Puncture/cath sites:  Right radial (ecchymotic, small hematoma), right brachial (D/I), right groin, and R IJ    Labs:   Lab Results   Component Value Date    WBC 9.9 12/24/2020    HGB 18.5 12/24/2020    HCT 55.8 12/24/2020     12/24/2020     Lab Results   Component Value Date    K 3.8 12/24/2020    CREATININE 0.5 12/24/2020    CALCIUM 10.0 12/24/2020     Assessment/Plan:  As per CC:     Plan:   -Vein mapping of the LLE shows small thickened wall vein throughout not suitable to be used as conduit for CABG. The RLE has been used for right Femoro peroneal bypass in the past.  Known small radial arteries from patient' heart cath from yesterday by ultrasound. Patient unfortunately is left with very limited options in term of conduits for CABG. In addition, his echo from today show diminished LV EF in the 30% range and dilated and dysfunctional RV. This puts the patient at higher risk for post cardiotomy shock, acute RV failure. Due to his limited arterial access mechanical support in this scenario could be very challenging and would carry significant risk for catastrophic complications of visceral ischemia and limb ischemia/limb loss    I discussed with Dr. Caridad Dias from Cardiology and dr Neela Marc from Interventional  cardiology. Patient is not a candidate for CABG at our institution.  -Will defer to cardiology for medical management versus PCI at our instituion  -Consideration could be given for a referral to a quaternary referral center with capabilities of post cardiotomy RV support such us inhaled NO, prostaglandins and high risk CABG/hybrid procedures to treat his CAD   -I also Discussed with patient and his wife at length- all questions answered       We will sign off for now, but please do not hesitate to contact us with any questions. Thank you for the consultation.         Electronically signed by CARLOS Cruz CNP on 12/24/2020 at 8:42 AM

## 2020-12-25 NOTE — DISCHARGE SUMMARY
[facility]    Patient Demographics    Patient. Tino Quintanilla  Date of Birth. 1955  MRN. [id number]     Primary care provider. MERCEDES Allison  (Tel: [phone])    Admit date: 12/23/2020    Discharge date (blank if same as Note Date): 12/24/2020  Note Date: 12/25/2020     Reason for Hospitalization. Multi-vessel CAD , eval for CABG    Significant Findings. Principal Problem:    Syncope  Active Problems:    Ischemic cardiomyopathy    CVA (cerebral vascular accident) ([address]    Atherosclerosis of native arteries of extremity with rest pain ([address]    Essential hypertension    Hyperlipidemia    Tobacco abuse    Abnormal stress test    Unstable angina (HCC)    Pulmonary hypertension (HCC)    Multiple vessel coronary artery disease    Shortness of breath  Resolved Problems:    * No resolved hospital problems. *       Problems and results from this hospitalization that need follow up. 1. CAD    Significant test results and incidental findings. XR CHEST PORTABLE   Final Result   Chronic pattern of interstitial change favoring underlying COPD with no acute   pulmonary finding. VL PRE OP VEIN MAPPING           Invasive procedures and treatments. 1. None     Problem-based Hospital Course. Wisam Peña is a 63 y. o. male with history of CAD, hypertension, hyperlipidemia, PVD, anxiety, alcohol abuse. Patient initially presented to cardiology clinic to see Dr. Barr on 11/23 with complaints of multiple syncopal episodes and abnormal EKG as noted by PCP.  Patient underwent scheduled outpatient cardiac cath today, 12/23 and noted to have multivessel coronary artery disease.  Cardiac cath showed multi-vessel CAD.  Patient was admitted with CTVS consult to eval and prepare for possible CABG.   Multi-vessel CAD: Not a candidate for CABG as per Dr. Mariana Moon statin.  Information about where to get these medications is not yet available    Ask your nurse or doctor about these medications  · aspirin 81 MG chewable tablet  · carvedilol 6.25 MG tablet  · enoxaparin 40 MG/0.4ML injection  · LORazepam 1 MG tablet  · multivitamin Tabs tablet  · nicotine 14 MG/24HR  · polyethylene glycol 17 g packet  · potassium chloride 20 MEQ extended release tablet  · promethazine 12.5 MG tablet  · thiamine mononitrate 100 MG tablet  · valsartan 40 MG tablet         Discharge recommendations given to patient. Follow Up. tranferred to [facility]  Disposition. To [facility] for second opinion from CT surgery service  Activity. activity as tolerated  Diet: No diet orders on file      Spent 45 minutes in discharge process.     Signed:  Rosy Chi MD     12/25/2020 1:07 AM

## 2020-12-25 NOTE — PROGRESS NOTES
Gave report to [facility] Anita Garay,  per ambulance at [address] [place] [address].

## 2020-12-25 NOTE — PROGRESS NOTES
Transport EMS arrived to  pt. All paperwork signed, CD given to EMS to give to [facility]. Remote tele removed from pt. Pt has all belongings. All questions answered.

## 2020-12-26 LAB
ESTIMATED AVERAGE GLUCOSE: 134.1 MG/DL
HBA1C MFR BLD: 6.3 %

## 2021-01-06 NOTE — PROGRESS NOTES
[address]     Outpatient Follow Up Note    CHIEF COMPLAINT / HPI: Hospital Follow Up secondary to coronary artery disease     Hospital record has been reviewed  Hospital Course progressed as follows per discharge summary:     12/23/20-12/24/20  [address] Peña is a 63 y. o. male with history of CAD, hypertension, hyperlipidemia, PVD, anxiety, alcohol abuse. Patient initially presented to cardiology clinic to see Dr. Barr on 11/23 with complaints of multiple syncopal episodes and abnormal EKG as noted by PCP.  Patient underwent scheduled outpatient cardiac cath today, 12/23 and noted to have multivessel coronary artery disease. Cardiac cath showed multi-vessel CAD.  Patient was admitted with CTVS consult to eval and prepare for possible CABG.   Multi-vessel CAD: Not a candidate for CABG as per Dr. Ruddy Reynolds surgery.  Resumed statin.   Pulmonary hypertension: As noted on cardiac cath. [facility] [employer] team/Dr. Alcaraz consulted as per Dr. Rogelio Flores recs.       Deepthi Munson is 63 y.o. male who presents today for a routine follow up after a recent hospitalization related to the above mentioned issues. Subjective:   Mr. Fidelia Fonseca [facility] today [facility] [place] [address]. He was found to not be an appropriate candidate for high risk PCI and CT surgeons informed him that he would be a high risk for surgical intervention. Mr. Fidelia Fonseca declined CABG. Today, he denies any chest pain, shortness of breath, palpitations, dizziness, or edema. He states he has not had any syncopal episodes since 12/8/20 and is following up with neurology 1/28/21. He states he has not had a cigarette since 12/23.      Past Medical History:   Diagnosis Date    Anxiety     CAD (coronary artery disease)     Hyperlipidemia     Hypertension     PVD (peripheral vascular disease) [facility]      Social History:    Social History     Tobacco Use   Smoking Status Current Every Day Smoker    Packs/day: 1.50  Years: 40.00    Pack years: 60.00    Last attempt to quit: 3/25/2013    Years since quittin.7   Smokeless Tobacco Never Used     Current Medications:  Current Outpatient Medications   Medication Sig Dispense Refill    ALPRAZolam (XANAX) 1 MG tablet Take 1 tablet by mouth daily for 30 days. 30 tablet 0    niacin (NIASPAN) 1000 MG extended release tablet Take 1 tablet by mouth nightly 30 tablet 0    aspirin 81 MG chewable tablet Take 1 tablet by mouth daily 30 tablet 0    polyethylene glycol (GLYCOLAX) 17 g packet Take 17 g by mouth daily as needed for Constipation 527 g 0    potassium chloride (KLOR-CON M) 20 MEQ extended release tablet Take 2 tablets by mouth as needed (Potassium Replacement) 60 tablet 0    thiamine mononitrate 100 MG tablet Take 1 tablet by mouth daily 30 tablet 0    Multiple Vitamin (MULTIVITAMIN) TABS tablet Take 1 tablet by mouth daily 30 tablet 0    LORazepam (ATIVAN) 1 MG tablet Take 1 tablet by mouth every hour as needed (For alcohol withdrawal.) for up to 30 days. Indications: Alcohol Withdrawal Syndrome 30 tablet 0    nicotine (NICODERM CQ) 14 MG/24HR Place 1 patch onto the skin daily 30 patch 0    enoxaparin (LOVENOX) 40 MG/0.4ML injection Inject 0.4 mLs into the skin daily 30 Syringe 0    carvedilol (COREG) 6.25 MG tablet Take 1 tablet by mouth 2 times daily (with meals) 60 tablet 0    valsartan (DIOVAN) 40 MG tablet Take 1 tablet by mouth daily 30 tablet 0    atorvastatin (LIPITOR) 40 MG tablet Take 1 tablet by mouth daily 90 tablet 1     No current facility-administered medications for this visit. REVIEW OF SYSTEMS:   CONSTITUTIONAL: No major weight gain or loss, fatigue, weakness, night sweats or fever. There's been no change in energy level, sleep pattern, or activity level. HEENT: No new vision difficulties or ringing in the ears. RESPIRATORY: No new SOB, PND, orthopnea or cough.    CARDIOVASCULAR: See HPI  GI: No nausea, vomiting, diarrhea, constipation, abdominal pain or changes in bowel habits. : No urinary frequency, urgency, incontinence hematuria or dysuria. SKIN: No cyanosis or skin lesions. MUSCULOSKELETAL: No new muscle or joint pain. NEUROLOGICAL: No syncope or TIA-like symptoms. PSYCHIATRIC: No anxiety, pain, insomnia or depression    Objective:   PHYSICAL EXAM:        VITALS:  /60 (Site: Left Upper Arm, Position: Sitting, Cuff Size: Medium Adult)   Pulse 73   Ht 5' 7\" (1.702 m)   Wt 151 lb 9.6 oz (68.8 kg)   SpO2 92%   BMI 23.74 kg/m²     CONSTITUTIONAL: Cooperative, no apparent distress, and appears well nourished / developed  NEUROLOGIC:  Awake and orientated to person, place and time. PSYCH: Calm affect. SKIN: Warm and dry. HEENT: Sclera non-icteric, normocephalic, neck supple, no elevation of JVP, normal carotid pulses with no bruits and thyroid normal size. LUNGS:  No increased work of breathing and clear to auscultation, no crackles or wheezing. CARDIOVASCULAR:  Regular rate and rhythm with no murmurs, gallops, rubs, or abnormal heart sounds, normal PMI. The apical impulses not displaced. Heart tones are crisp and normal                                                                                      ABDOMEN:  Normal bowel sounds, non-distended and non-tender to palpation   EXT: No edema, no calf tenderness. Pulses are present bilaterally.     DATA:    Lab Results   Component Value Date    ALT 23 12/24/2020    AST 41 (H) 12/24/2020    ALKPHOS 161 (H) 12/24/2020    BILITOT 1.0 12/24/2020     Lab Results   Component Value Date    CREATININE 0.5 (L) 12/24/2020    BUN 13 12/24/2020     12/24/2020    K 3.8 12/24/2020     12/24/2020    CO2 23 12/24/2020     Lab Results   Component Value Date    TSH 3.35 12/24/2020     Lab Results   Component Value Date    WBC 9.9 12/24/2020    HGB 18.5 (H) 12/24/2020    HCT 55.8 (H) 12/24/2020    MCV 86.0 12/24/2020     12/24/2020     No hypertension (likely group I)  ~Normal left heart filling pressures     Last ECG:    Assessment:      Diagnosis Orders   1. Coronary artery disease involving native coronary artery of native heart without angina pectoris   ~ Cardiac cath (12/23/20): multivessel disease. High risk for PCI and not a surgical candidate per [facility] CV surgeons   ~ Sought second opinion at [address] - too high risk for PCI. Patient opted to not have CABG.    ~ on ASA / BB (atenolol) / statin  ~ denies any complaints of angina     2. Pulmonary hypertension ([address]   ~ Per cath (12/23/20)   ~ PFTs at [address] demonstrated FEV1 of 45% with moderate to severe obstruction  ~ follow up with Dr. Oneta Najjar 1/19/21        3. Ischemic cardiomyopathy   ~ echo (12/24/20) EF 30%  ~ discharged from [address] on atenolol and nifedipine      4. Syncope, unspecified syncope type   ~ Denies episode since 12/8  ~ following with Dr. Florecita Knowles 1/28/21    5. Tobacco abuse   ~ last cigarette 12/23    6. Mixed hyperlipidemia            ~ atorvastatin, niacin      Patient is stable since hospital discharge. Plan:   1. Stop nifedipine and start losartan. Change atenolol to coreg 6.25 mg BID. 2. Follow up with neurology and pulmonology scheduled   3. Patient states he doesn't want to return for at least 3 months. Will schedule follow up with Dr. Yudith Zayas then. 4. Discussed DAPT with Dr. Yudith Zayas. Will avoid for now due to high fall risk. I have addressed the patient's cardiac risk factors and adjusted pharmacologic treatment as needed. In addition, I have reinforced the need for patient directed risk factor modification. Further evaluation will be based upon the patient's clinical course and testing results. All questions and concerns were addressed to the patient/family (Wife, Shine Lemos)    The patient  Currently is not smoking. The risks related to smoking were reviewed with the patient. Recommend maintaining a smoke-free lifestyle.      Angiotension

## 2021-01-07 ENCOUNTER — OFFICE VISIT (OUTPATIENT)
Dept: CARDIOLOGY CLINIC | Age: 66
End: 2021-01-07
Payer: COMMERCIAL

## 2021-01-07 VITALS
SYSTOLIC BLOOD PRESSURE: 120 MMHG | HEART RATE: 73 BPM | DIASTOLIC BLOOD PRESSURE: 60 MMHG | OXYGEN SATURATION: 92 % | BODY MASS INDEX: 23.79 KG/M2 | HEIGHT: 67 IN | WEIGHT: 151.6 LBS

## 2021-01-07 DIAGNOSIS — I25.5 ISCHEMIC CARDIOMYOPATHY: ICD-10-CM

## 2021-01-07 DIAGNOSIS — I27.20 PULMONARY HYPERTENSION (HCC): ICD-10-CM

## 2021-01-07 DIAGNOSIS — Z72.0 TOBACCO ABUSE: ICD-10-CM

## 2021-01-07 DIAGNOSIS — R55 SYNCOPE, UNSPECIFIED SYNCOPE TYPE: ICD-10-CM

## 2021-01-07 DIAGNOSIS — I25.10 CORONARY ARTERY DISEASE INVOLVING NATIVE CORONARY ARTERY OF NATIVE HEART WITHOUT ANGINA PECTORIS: Primary | ICD-10-CM

## 2021-01-07 PROCEDURE — G8428 CUR MEDS NOT DOCUMENT: HCPCS | Performed by: NURSE PRACTITIONER

## 2021-01-07 PROCEDURE — 3017F COLORECTAL CA SCREEN DOC REV: CPT | Performed by: NURSE PRACTITIONER

## 2021-01-07 PROCEDURE — 1111F DSCHRG MED/CURRENT MED MERGE: CPT | Performed by: NURSE PRACTITIONER

## 2021-01-07 PROCEDURE — 4004F PT TOBACCO SCREEN RCVD TLK: CPT | Performed by: NURSE PRACTITIONER

## 2021-01-07 PROCEDURE — G8484 FLU IMMUNIZE NO ADMIN: HCPCS | Performed by: NURSE PRACTITIONER

## 2021-01-07 PROCEDURE — G8420 CALC BMI NORM PARAMETERS: HCPCS | Performed by: NURSE PRACTITIONER

## 2021-01-07 PROCEDURE — 99214 OFFICE O/P EST MOD 30 MIN: CPT | Performed by: NURSE PRACTITIONER

## 2021-01-07 RX ORDER — CARVEDILOL 6.25 MG/1
6.25 TABLET ORAL 2 TIMES DAILY
Qty: 30 TABLET | Refills: 5 | Status: SHIPPED | OUTPATIENT
Start: 2021-01-07 | End: 2021-01-19 | Stop reason: SDUPTHER

## 2021-01-07 RX ORDER — ATENOLOL 50 MG/1
50 TABLET ORAL DAILY
COMMUNITY
End: 2021-01-07

## 2021-01-07 RX ORDER — VALSARTAN 40 MG/1
40 TABLET ORAL DAILY
Qty: 30 TABLET | Refills: 3 | Status: SHIPPED | OUTPATIENT
Start: 2021-01-07

## 2021-01-19 RX ORDER — CARVEDILOL 6.25 MG/1
6.25 TABLET ORAL 2 TIMES DAILY
Qty: 180 TABLET | Refills: 1 | Status: SHIPPED | OUTPATIENT
Start: 2021-01-19

## 2021-02-04 ENCOUNTER — TELEPHONE (OUTPATIENT)
Dept: CARDIOLOGY CLINIC | Age: 66
End: 2021-02-04

## 2021-02-04 NOTE — TELEPHONE ENCOUNTER
Ashley with [facility] calling, pt wants a refill on methocarbamol and they want to know if [facility] wants to refill this or should they do it?  Pls call to advise Thank you

## 2021-02-04 NOTE — TELEPHONE ENCOUNTER
Please let ambreen know that I have not prescribed methocarbamol to this patient. If they feel as though he needs a muscle relaxer, they should prescribe it. Do they mean metoprolol? He is no longer taking metoprolol and is now taking carvedilol.

## 2021-02-05 NOTE — TELEPHONE ENCOUNTER
Spoke with Ashley at [address] [place] [address], she states the pharmacy was requesting methocarbamol and thought that the rx had come from Cardiology. Advised of [facility] message below and verbalized understanding.